# Patient Record
Sex: FEMALE | Race: WHITE | Employment: FULL TIME | URBAN - NONMETROPOLITAN AREA
[De-identification: names, ages, dates, MRNs, and addresses within clinical notes are randomized per-mention and may not be internally consistent; named-entity substitution may affect disease eponyms.]

---

## 2017-09-25 ENCOUNTER — TELEPHONE (OUTPATIENT)
Dept: INTERNAL MEDICINE CLINIC | Age: 62
End: 2017-09-25

## 2017-09-26 ENCOUNTER — TELEPHONE (OUTPATIENT)
Dept: INTERNAL MEDICINE CLINIC | Age: 62
End: 2017-09-26

## 2017-09-28 NOTE — TELEPHONE ENCOUNTER
Last seen 11/3/16, this is a 1 year f/u. Last had BMP, HgA1c, Lipid, HFP, Microalbumin-5577 in 10/8/16.

## 2017-10-02 DIAGNOSIS — E11.9 CONTROLLED TYPE 2 DIABETES MELLITUS WITHOUT COMPLICATION, WITHOUT LONG-TERM CURRENT USE OF INSULIN (HCC): ICD-10-CM

## 2017-10-02 DIAGNOSIS — I10 ESSENTIAL HYPERTENSION: Primary | ICD-10-CM

## 2017-10-02 DIAGNOSIS — E78.1 PURE HYPERGLYCERIDEMIA: ICD-10-CM

## 2017-10-07 LAB
CHOLESTEROL, TOTAL: 194 MG/DL
CHOLESTEROL/HDL RATIO: 2.9
HDLC SERPL-MCNC: 65 MG/DL (ref 35–70)
LDL CHOLESTEROL CALCULATED: 108 MG/DL (ref 0–160)
TRIGL SERPL-MCNC: 104 MG/DL
VLDLC SERPL CALC-MCNC: 20 MG/DL

## 2017-10-31 ENCOUNTER — HOSPITAL ENCOUNTER (OUTPATIENT)
Age: 62
Discharge: HOME | End: 2017-10-31
Payer: COMMERCIAL

## 2017-10-31 ENCOUNTER — OFFICE VISIT (OUTPATIENT)
Dept: INTERNAL MEDICINE CLINIC | Age: 62
End: 2017-10-31
Payer: COMMERCIAL

## 2017-10-31 VITALS
HEART RATE: 72 BPM | BODY MASS INDEX: 22.16 KG/M2 | HEIGHT: 65 IN | WEIGHT: 133 LBS | DIASTOLIC BLOOD PRESSURE: 84 MMHG | SYSTOLIC BLOOD PRESSURE: 136 MMHG

## 2017-10-31 DIAGNOSIS — E78.5 HYPERLIPIDEMIA, UNSPECIFIED HYPERLIPIDEMIA TYPE: ICD-10-CM

## 2017-10-31 DIAGNOSIS — Z91.199 NONCOMPLIANCE: ICD-10-CM

## 2017-10-31 DIAGNOSIS — I10 ESSENTIAL HYPERTENSION: ICD-10-CM

## 2017-10-31 DIAGNOSIS — E11.9 CONTROLLED TYPE 2 DIABETES MELLITUS WITHOUT COMPLICATION, WITHOUT LONG-TERM CURRENT USE OF INSULIN (HCC): Primary | ICD-10-CM

## 2017-10-31 LAB
CREATININE, URINE: 75.6 MG/DL
MICROALBUMIN UR-MCNC: 5.06 MG/DL
MICROALBUMIN/CREAT UR-RTO: 67 MG/G (ref 0–30)

## 2017-10-31 PROCEDURE — 93000 ELECTROCARDIOGRAM COMPLETE: CPT | Performed by: INTERNAL MEDICINE

## 2017-10-31 PROCEDURE — 82043 UR ALBUMIN QUANTITATIVE: CPT

## 2017-10-31 PROCEDURE — 99214 OFFICE O/P EST MOD 30 MIN: CPT | Performed by: INTERNAL MEDICINE

## 2017-10-31 RX ORDER — ATORVASTATIN CALCIUM 10 MG/1
TABLET, FILM COATED ORAL
Qty: 30 TABLET | Refills: 5 | Status: CANCELLED | OUTPATIENT
Start: 2017-10-31

## 2017-10-31 RX ORDER — GLIMEPIRIDE 1 MG/1
TABLET ORAL
Qty: 30 TABLET | Refills: 6 | Status: CANCELLED | OUTPATIENT
Start: 2017-10-31 | End: 2018-10-31

## 2017-10-31 RX ORDER — LISINOPRIL 5 MG/1
TABLET ORAL
Qty: 30 TABLET | Refills: 5 | Status: CANCELLED | OUTPATIENT
Start: 2017-10-31

## 2017-10-31 ASSESSMENT — PATIENT HEALTH QUESTIONNAIRE - PHQ9
SUM OF ALL RESPONSES TO PHQ QUESTIONS 1-9: 0
2. FEELING DOWN, DEPRESSED OR HOPELESS: 0
SUM OF ALL RESPONSES TO PHQ9 QUESTIONS 1 & 2: 0
1. LITTLE INTEREST OR PLEASURE IN DOING THINGS: 0

## 2017-10-31 NOTE — PROGRESS NOTES
[facility] PROFESSIONAL SERVS  PHYSICIANS [place] [address] [place] [address] [place], [place] Christianson  Dept: [phone]  Dept Fax: [phone]      Chief Complaint   Patient presents with    Diabetes    Hypertension       Patient presents for evaluation of diabetes. I have never seen the patient before. This patient is followed regularly by  Antwon Hollis NP. Patient used to be seen by Dr. Savanna Milligan who no longer works here. The patient is diabetic as well as hypertensive  She last saw Dr. Savanna Milligan a year ago. She says she feels great. She is here with her . She denies chest pain, shortness of breath, dizziness or syncope    Patient Active Problem List   Diagnosis    HTN (hypertension)    DM II [facility]di[facility] mellitus, type II), [facility] ([facility]    Hyperlipemia       Current Outpatient Prescriptions   Medication Sig Dispense Refill    glimepiride (AMARYL) 1 MG tablet Change to one QOD 30 tablet 6    metFORMIN (GLUCOPHAGE) 1000 MG tablet TAKE 1 TABLET BY MOUTH TWO TIMES A DAY WITH MEALS 60 tablet 5    lisinopril (PRINIVIL;ZESTRIL) 5 MG tablet TAKE 1 TABLET BY MOUTH ONE TIME A DAY  30 tablet 5    atorvastatin (LIPITOR) 10 MG tablet TAKE 1 TABLET BY MOUTH ONE TIME A DAY  30 tablet 5    aspirin 81 MG tablet Take 81 mg by mouth daily. No current facility-administered medications for this visit. History reviewed. No pertinent surgical history. Allergies   Allergen Reactions    Fish-Derived Products Swelling       Social History     Social History    Marital status:      Spouse name: N/A    Number of children: N/A    Years of education: N/A     Occupational History    Not on file.      Social History Main Topics    Smoking status: Never Smoker    Smokeless tobacco: Never Used    Alcohol use No    Drug use: No    Sexual activity: Not on file     Other Topics Concern    Not on file     Social History Narrative    No narrative on file   3 children  Retired  for [employer]    Family History   Problem Relation Age of Onset    Diabetes Mother     Heart Disease Father        Review of Systems - General ROS: negative  Psychological ROS: negative  Hematological and Lymphatic ROS: negative  Respiratory ROS: no cough, shortness of breath, or wheezing  Cardiovascular ROS: no chest pain or dyspnea on exertion  Gastrointestinal ROS: no abdominal pain, change in bowel habits, or black or bloody stools  Genito-Urinary ROS: no dysuria, trouble voiding, or hematuria  Musculoskeletal ROS: negative  Neurological ROS: no TIA or stroke symptoms  Dermatological ROS: negative    Blood pressure 136/84, pulse 72, height 5' 5\" (1.651 m), weight 133 lb (60.3 kg). Physical Examination: General appearance - alert, well appearing, and in no distress  Mental status - alert, oriented to person, place, and time  Neck - supple, no significant adenopathy  Chest - clear to auscultation, no wheezes, rales or rhonchi, symmetric air entry  Heart - normal rate, regular rhythm, normal S1, S2, no murmurs, rubs, clicks or gallops  Abdomen - soft, nontender, nondistended, no masses or organomegaly  Neurological - alert, oriented, normal speech, no focal findings or movement disorder noted  Musculoskeletal - no joint tenderness, deformity or swelling  Extremities - peripheral pulses normal, no pedal edema, no clubbing or cyanosis  Skin - normal coloration and turgor, no rashes, no suspicious skin lesions noted         1. Controlled type 2 diabetes mellitus without complication, without long-term current use of insulin (HCC)  Microalbumin / Creatinine Urine Ratio    CANCELED: POCT glycosylated hemoglobin (Hb A1C)    CANCELED: 01688 - Collection Capillary Blood Specimen    CANCELED: Microalbumin / Creatinine Urine Ratio   2. Essential hypertension  EKG 12 Lead   3. Noncompliance     4.  Hyperlipidemia, unspecified hyperlipidemia type         Orders Placed This Encounter   Procedures    Microalbumin / Creatinine Urine Ratio     Standing Status:   Future     Standing Expiration Date:   10/31/2018    EKG 12 Lead     Order Specific Question:   Reason for Exam?     Answer: Other        EKG shows normal sinus rhythm, normal EKG  Extensive counseling was done regarding diabetes.  The goals are to decrease or prevent the complications of diabetes and improve survival. The following goals were discussed  HgbA1c < 7 (providing no hypoglycemia)    BMI  18-25    BP < 130/80    STATIN(medium or high risk)    DIET <20% protein  < 30% fat, no trans fats, calorie restricted if BMI high    URINE MICROALBUMIN/CREATINIE  < 30     DILATED EYE EXAM EVERY 1-2 YEARS    MONITOR FEET FOR SORES, NEUROPATHY, ETC    REGULAR DENTAL CARE    At the end of the visit the patient said she has not taking any medications since March   She says she's leaving it in the hands of the Lord  She said she will not take any medications in the future  I tred to get her to change her mind, but she would not  I will see her in a year

## 2018-10-01 ENCOUNTER — TELEPHONE (OUTPATIENT)
Dept: INTERNAL MEDICINE CLINIC | Age: 63
End: 2018-10-01

## 2018-10-01 NOTE — TELEPHONE ENCOUNTER
Patient has appointment 10/30/18 and wants to know what labs are needing to be done. Previously had lipid, hepatic, tsh, free t4, cbc. Call pt and they will  orders.

## 2018-10-02 DIAGNOSIS — E78.5 HYPERLIPIDEMIA, UNSPECIFIED HYPERLIPIDEMIA TYPE: ICD-10-CM

## 2018-10-02 DIAGNOSIS — I10 ESSENTIAL HYPERTENSION: Primary | ICD-10-CM

## 2018-10-18 LAB
ABSOLUTE BASO #: 100 /CMM (ref 0–200)
ABSOLUTE EOS #: 200 /CMM (ref 0–500)
ABSOLUTE LYMPH #: 2200 /CMM (ref 1000–4800)
ABSOLUTE MONO #: 500 /CMM (ref 0–800)
ABSOLUTE NEUT #: 4500 /CMM (ref 1800–7700)
ALBUMIN SERPL-MCNC: 4.6 GM/DL (ref 3.5–5)
ALP BLD-CCNC: 82 IU/L (ref 39–118)
ALT SERPL-CCNC: 15 IU/L (ref 10–40)
AST SERPL-CCNC: 15 IU/L (ref 15–41)
BASOPHILS RELATIVE PERCENT: 1.1 % (ref 0–2)
BILIRUB SERPL-MCNC: 0.4 MG/DL (ref 0.2–1)
BILIRUBIN DIRECT: 0.1 MG/DL (ref 0.1–0.2)
CHOLESTEROL/HDL RELATIVE RISK: 3.3 (ref 4–4.4)
CHOLESTEROL: 162 MG/DL
DIRECT-LDL / HDL RISK: 2.4
EOSINOPHILS RELATIVE PERCENT: 2.6 % (ref 0–6)
HCT VFR BLD CALC: 39.2 % (ref 35–44)
HDLC SERPL-MCNC: 48 MG/DL
HEMOGLOBIN: 12.8 GM/DL (ref 12–15)
LDL CHOLESTEROL DIRECT: 119 MG/DL
LYMPHOCYTES RELATIVE PERCENT: 29.8 % (ref 15–45)
MCH RBC QN AUTO: 28.4 PG (ref 27.5–33)
MCHC RBC AUTO-ENTMCNC: 32.6 GM/DL (ref 33–36)
MCV RBC AUTO: 87.3 CU MIC (ref 80–97)
MONOCYTES RELATIVE PERCENT: 6.3 % (ref 2–10)
NEUTROPHILS RELATIVE PERCENT: 60.2 % (ref 40–70)
NUCLEATED RBCS: 0 /100 WBC
PDW BLD-RTO: 13.6 % (ref 12–16)
PLATELET # BLD: 302 TH/CMM (ref 150–400)
RBC # BLD: 4.5 MIL/CMM (ref 4–5.1)
T4 FREE: 0.97 NG/DL (ref 0.61–1.12)
TOTAL PROTEIN: 7.7 G/DL (ref 6.2–8)
TRIGL SERPL-MCNC: 77 MG/DL
TSH SERPL DL<=0.05 MIU/L-ACNC: 2.81 MCIU/ML (ref 0.49–4.67)
VLDLC SERPL CALC-MCNC: 15 MG/DL
WBC # BLD: 7.5 TH/CMM (ref 4.4–10.5)

## 2018-10-22 ENCOUNTER — TELEPHONE (OUTPATIENT)
Dept: INTERNAL MEDICINE | Age: 63
End: 2018-10-22

## 2018-10-25 ENCOUNTER — TELEPHONE (OUTPATIENT)
Dept: INTERNAL MEDICINE CLINIC | Age: 63
End: 2018-10-25

## 2018-10-30 ENCOUNTER — OFFICE VISIT (OUTPATIENT)
Dept: INTERNAL MEDICINE CLINIC | Age: 63
End: 2018-10-30
Payer: MEDICARE

## 2018-10-30 VITALS
HEART RATE: 96 BPM | WEIGHT: 135 LBS | BODY MASS INDEX: 22.49 KG/M2 | OXYGEN SATURATION: 98 % | DIASTOLIC BLOOD PRESSURE: 94 MMHG | SYSTOLIC BLOOD PRESSURE: 180 MMHG | HEIGHT: 65 IN

## 2018-10-30 DIAGNOSIS — R01.1 HEART MURMUR: ICD-10-CM

## 2018-10-30 DIAGNOSIS — E78.5 HYPERLIPIDEMIA, UNSPECIFIED HYPERLIPIDEMIA TYPE: ICD-10-CM

## 2018-10-30 DIAGNOSIS — E11.65 UNCONTROLLED TYPE 2 DIABETES MELLITUS WITH HYPERGLYCEMIA (HCC): Primary | ICD-10-CM

## 2018-10-30 DIAGNOSIS — I10 ESSENTIAL HYPERTENSION: ICD-10-CM

## 2018-10-30 LAB — HBA1C MFR BLD: 8.1 % (ref 4.3–5.7)

## 2018-10-30 PROCEDURE — 3017F COLORECTAL CA SCREEN DOC REV: CPT | Performed by: INTERNAL MEDICINE

## 2018-10-30 PROCEDURE — 1123F ACP DISCUSS/DSCN MKR DOCD: CPT | Performed by: INTERNAL MEDICINE

## 2018-10-30 PROCEDURE — 1036F TOBACCO NON-USER: CPT | Performed by: INTERNAL MEDICINE

## 2018-10-30 PROCEDURE — 4040F PNEUMOC VAC/ADMIN/RCVD: CPT | Performed by: INTERNAL MEDICINE

## 2018-10-30 PROCEDURE — G8400 PT W/DXA NO RESULTS DOC: HCPCS | Performed by: INTERNAL MEDICINE

## 2018-10-30 PROCEDURE — 1101F PT FALLS ASSESS-DOCD LE1/YR: CPT | Performed by: INTERNAL MEDICINE

## 2018-10-30 PROCEDURE — 99204 OFFICE O/P NEW MOD 45 MIN: CPT | Performed by: INTERNAL MEDICINE

## 2018-10-30 PROCEDURE — 2022F DILAT RTA XM EVC RTNOPTHY: CPT | Performed by: INTERNAL MEDICINE

## 2018-10-30 PROCEDURE — G8420 CALC BMI NORM PARAMETERS: HCPCS | Performed by: INTERNAL MEDICINE

## 2018-10-30 PROCEDURE — G8427 DOCREV CUR MEDS BY ELIG CLIN: HCPCS | Performed by: INTERNAL MEDICINE

## 2018-10-30 PROCEDURE — 1090F PRES/ABSN URINE INCON ASSESS: CPT | Performed by: INTERNAL MEDICINE

## 2018-10-30 PROCEDURE — G8484 FLU IMMUNIZE NO ADMIN: HCPCS | Performed by: INTERNAL MEDICINE

## 2018-10-30 PROCEDURE — 3045F PR MOST RECENT HEMOGLOBIN A1C LEVEL 7.0-9.0%: CPT | Performed by: INTERNAL MEDICINE

## 2018-10-30 PROCEDURE — 83036 HEMOGLOBIN GLYCOSYLATED A1C: CPT | Performed by: INTERNAL MEDICINE

## 2018-10-30 ASSESSMENT — PATIENT HEALTH QUESTIONNAIRE - PHQ9
SUM OF ALL RESPONSES TO PHQ9 QUESTIONS 1 & 2: 0
SUM OF ALL RESPONSES TO PHQ QUESTIONS 1-9: 0
SUM OF ALL RESPONSES TO PHQ QUESTIONS 1-9: 0
2. FEELING DOWN, DEPRESSED OR HOPELESS: 0
1. LITTLE INTEREST OR PLEASURE IN DOING THINGS: 0

## 2018-10-31 RX ORDER — GLIMEPIRIDE 1 MG/1
1 TABLET ORAL
Qty: 90 TABLET | Refills: 3 | Status: SHIPPED | OUTPATIENT
Start: 2018-10-31 | End: 2019-05-20 | Stop reason: SINTOL

## 2018-10-31 RX ORDER — ATORVASTATIN CALCIUM 10 MG/1
TABLET, FILM COATED ORAL
Qty: 90 TABLET | Refills: 3 | Status: SHIPPED | OUTPATIENT
Start: 2018-10-31 | End: 2019-10-28 | Stop reason: SDUPTHER

## 2018-10-31 RX ORDER — LISINOPRIL 5 MG/1
TABLET ORAL
Qty: 90 TABLET | Refills: 3 | Status: SHIPPED | OUTPATIENT
Start: 2018-10-31 | End: 2019-10-21 | Stop reason: SDUPTHER

## 2018-11-20 ENCOUNTER — OFFICE VISIT (OUTPATIENT)
Dept: INTERNAL MEDICINE CLINIC | Age: 63
End: 2018-11-20
Payer: MEDICARE

## 2018-11-20 VITALS
HEART RATE: 84 BPM | SYSTOLIC BLOOD PRESSURE: 134 MMHG | HEIGHT: 65 IN | WEIGHT: 133.6 LBS | DIASTOLIC BLOOD PRESSURE: 70 MMHG | BODY MASS INDEX: 22.26 KG/M2

## 2018-11-20 DIAGNOSIS — R01.0 INNOCENT HEART MURMUR: Primary | ICD-10-CM

## 2018-11-20 DIAGNOSIS — I10 ESSENTIAL HYPERTENSION: ICD-10-CM

## 2018-11-20 DIAGNOSIS — E11.9 CONTROLLED TYPE 2 DIABETES MELLITUS WITHOUT COMPLICATION, WITHOUT LONG-TERM CURRENT USE OF INSULIN (HCC): ICD-10-CM

## 2018-11-20 DIAGNOSIS — E78.5 HYPERLIPIDEMIA, UNSPECIFIED HYPERLIPIDEMIA TYPE: ICD-10-CM

## 2018-11-20 PROCEDURE — G8400 PT W/DXA NO RESULTS DOC: HCPCS | Performed by: INTERNAL MEDICINE

## 2018-11-20 PROCEDURE — 1101F PT FALLS ASSESS-DOCD LE1/YR: CPT | Performed by: INTERNAL MEDICINE

## 2018-11-20 PROCEDURE — 1036F TOBACCO NON-USER: CPT | Performed by: INTERNAL MEDICINE

## 2018-11-20 PROCEDURE — 2022F DILAT RTA XM EVC RTNOPTHY: CPT | Performed by: INTERNAL MEDICINE

## 2018-11-20 PROCEDURE — G8420 CALC BMI NORM PARAMETERS: HCPCS | Performed by: INTERNAL MEDICINE

## 2018-11-20 PROCEDURE — 99212 OFFICE O/P EST SF 10 MIN: CPT | Performed by: INTERNAL MEDICINE

## 2018-11-20 PROCEDURE — 3045F PR MOST RECENT HEMOGLOBIN A1C LEVEL 7.0-9.0%: CPT | Performed by: INTERNAL MEDICINE

## 2018-11-20 PROCEDURE — 3017F COLORECTAL CA SCREEN DOC REV: CPT | Performed by: INTERNAL MEDICINE

## 2018-11-20 PROCEDURE — 1123F ACP DISCUSS/DSCN MKR DOCD: CPT | Performed by: INTERNAL MEDICINE

## 2018-11-20 PROCEDURE — 1090F PRES/ABSN URINE INCON ASSESS: CPT | Performed by: INTERNAL MEDICINE

## 2018-11-20 PROCEDURE — 4040F PNEUMOC VAC/ADMIN/RCVD: CPT | Performed by: INTERNAL MEDICINE

## 2018-11-20 PROCEDURE — G8427 DOCREV CUR MEDS BY ELIG CLIN: HCPCS | Performed by: INTERNAL MEDICINE

## 2018-11-20 PROCEDURE — G8484 FLU IMMUNIZE NO ADMIN: HCPCS | Performed by: INTERNAL MEDICINE

## 2019-05-20 ENCOUNTER — OFFICE VISIT (OUTPATIENT)
Dept: INTERNAL MEDICINE CLINIC | Age: 64
End: 2019-05-20
Payer: MEDICARE

## 2019-05-20 VITALS
HEART RATE: 89 BPM | BODY MASS INDEX: 18.74 KG/M2 | DIASTOLIC BLOOD PRESSURE: 66 MMHG | WEIGHT: 112.5 LBS | SYSTOLIC BLOOD PRESSURE: 130 MMHG | RESPIRATION RATE: 12 BRPM | HEIGHT: 65 IN

## 2019-05-20 DIAGNOSIS — R01.0 INNOCENT HEART MURMUR: ICD-10-CM

## 2019-05-20 DIAGNOSIS — E78.5 HYPERLIPIDEMIA, UNSPECIFIED HYPERLIPIDEMIA TYPE: ICD-10-CM

## 2019-05-20 DIAGNOSIS — I10 ESSENTIAL HYPERTENSION: ICD-10-CM

## 2019-05-20 DIAGNOSIS — E11.9 CONTROLLED TYPE 2 DIABETES MELLITUS WITHOUT COMPLICATION, WITHOUT LONG-TERM CURRENT USE OF INSULIN (HCC): Primary | ICD-10-CM

## 2019-05-20 LAB — HBA1C MFR BLD: 5.9 % (ref 4.3–5.7)

## 2019-05-20 PROCEDURE — 99214 OFFICE O/P EST MOD 30 MIN: CPT | Performed by: NURSE PRACTITIONER

## 2019-05-20 RX ORDER — METFORMIN HYDROCHLORIDE 500 MG/1
2000 TABLET, EXTENDED RELEASE ORAL
Qty: 360 TABLET | Refills: 1 | Status: SHIPPED | OUTPATIENT
Start: 2019-05-20 | End: 2019-11-05 | Stop reason: SDUPTHER

## 2019-05-20 RX ORDER — M-VIT,TX,IRON,MINS/CALC/FOLIC 27MG-0.4MG
1 TABLET ORAL DAILY
COMMUNITY

## 2019-05-20 ASSESSMENT — PATIENT HEALTH QUESTIONNAIRE - PHQ9
2. FEELING DOWN, DEPRESSED OR HOPELESS: 0
SUM OF ALL RESPONSES TO PHQ9 QUESTIONS 1 & 2: 0
SUM OF ALL RESPONSES TO PHQ QUESTIONS 1-9: 0
SUM OF ALL RESPONSES TO PHQ QUESTIONS 1-9: 0
1. LITTLE INTEREST OR PLEASURE IN DOING THINGS: 0

## 2019-05-20 NOTE — PATIENT INSTRUCTIONS
Extensive counseling was done regarding diabetes. The goals are to decrease or prevent the complications of diabetes and improve survival. The following goals were discussed  HgbA1c < 7 (providing no hypoglycemia)  - Today A1C is 5.9%  - Stop Glimeperide and call sugars in two weeks, sooner if any low glucose. Will switch to long acting Metformin due to diarrhea. BMI  18-25  - At goal, continue current dietary modifications. BP < 130/80    STATIN(medium or high risk)    URINE MICROALBUMIN/CREATININE  < 30    DILATED EYE EXAM EVERY 1-2 YEARS    MONITOR FEET FOR SORES, NEUROPATHY, ETC  - Diabetic foot care is important in order to avoid foot wounds:    Never go barefoot even at home. Test your bathwater temperature before you step in. Nails should be trimmed to the shape of the toe. Wash and check your feet for new skin changes every day. Socks should fit snugly, not be tight and be changed every day. Shoes should fit snugly, neither tight nor loose. If deformities or wounds you may need special fitted shoes. REGULAR DENTAL CARE    Echo recheck at one year    See me in 6 months, sooner if problems. Fasting labs prior ot next appointment, can get these at the local health fair.

## 2019-05-20 NOTE — PROGRESS NOTES
Chest - No distress. No increased work of breathing. Clear to auscultation in all fields, no wheezes, rales or rhonchi, symmetric air entry. Heart - normal rate, regular rhythm, normal S1, S2, no rubs  or gallops, 6-8/2 mid systolic murmur heard only at RUSB without radiation to carotids. Abdomen -soft, nontender, nondistended  Neurological - alert, oriented, grossly intact  Extremities - peripheral pulses normal, no pedal edema, no clubbing or cyanosis  Skin - warm and dry, no rashes, lesions, or wounds evident on exposed skin    DIABETIC FOOT EXAM  Visual inspection:  Deformity/amputation: absent  Skin lesions/pre-ulcerative calluses: absent  Edema: right- negative, left- negative    Sensory exam:  Monofilament sensation: normal  (minimum of 5 random plantar locations tested, avoiding callused areas - > 1 area with absence of sensation is + for neuropathy)    Plus at least one of the following:  Pulses: normal,   Pinprick: N/A  Proprioception: Intact  Vibration (128 Hz): N/A    Diagnostic Data:  No recent labs for review.      Lab Results   Component Value Date     10/08/2016    K 4.3 10/08/2016     10/08/2016    CO2 26 10/08/2016    BUN 14 10/08/2016    CREATININE 0.54 10/08/2016    GLUCOSE 116 10/08/2016    CALCIUM 9.5 10/08/2016      Lab Results   Component Value Date    LABA1C 5.9 (H) 05/20/2019     Lab Results   Component Value Date     10/08/2016     Lab Results   Component Value Date    CHOL 162 10/18/2018    CHOL 194 10/07/2017    CHOL 128 10/08/2016     Lab Results   Component Value Date    TRIG 77 10/18/2018    TRIG 104 10/07/2017    TRIG 107 10/08/2016     Lab Results   Component Value Date    HDL 48 10/18/2018    HDL 65 10/07/2017    HDL 58 10/08/2016     Lab Results   Component Value Date    LDLCALC 108 10/07/2017    LDLCALC 49 10/08/2016    LDLCALC 96 05/06/2014     Lab Results   Component Value Date    VLDL 15 10/18/2018    VLDL 20 10/07/2017    VLDL 21 10/08/2016     Lab Results   Component Value Date    CHOLHDLRATIO 2.9 10/07/2017     Lab Results   Component Value Date    WBC 7.5 10/18/2018    HGB 12.8 10/18/2018    HCT 39.2 10/18/2018    MCV 87.3 10/18/2018     10/18/2018         Assessment/Plan:   Diagnosis Orders   1. Controlled type 2 diabetes mellitus without complication, without long-term current use of insulin (HCC)  metFORMIN (GLUCOPHAGE-XR) 500 MG extended release tablet    Comprehensive Metabolic Panel, Fasting    HM DIABETES FOOT EXAM   2. Innocent heart murmur  ECHO Complete 2D W Doppler W Color   3. Hyperlipidemia, unspecified hyperlipidemia type  Comprehensive Metabolic Panel, Fasting    Lipid, Fasting    Microalbumin / Creatinine Urine Ratio   4. Essential hypertension  Comprehensive Metabolic Panel, Fasting       Orders Placed This Encounter   Procedures    Comprehensive Metabolic Panel, Fasting     Standing Status:   Future     Standing Expiration Date:   5/20/2020    Lipid, Fasting     Standing Status:   Future     Standing Expiration Date:   5/20/2020    Microalbumin / Creatinine Urine Ratio     Standing Status:   Future     Standing Expiration Date:   5/20/2020    POCT glycosylated hemoglobin (Hb A1C)    ECHO Complete 2D W Doppler W Color     Standing Status:   Future     Standing Expiration Date:   5/20/2020     Order Specific Question:   Reason for exam:     Answer:   aortic sclerosis, heart murmur recheck    HM DIABETES FOOT EXAM     1) DM 2, well controlled    Extensive counseling was done regarding diabetes. The goals are to decrease or prevent the complications of diabetes and improve survival. The following goals were discussed  HgbA1c < 7 (providing no hypoglycemia)  - Today A1C is 5.9%, recheck at next appointment. - Stop Glimeperide and call sugars in two weeks, sooner if any low glucose. Will switch to long acting Metformin due to diarrhea. BMI  18-25  - At goal, continue current dietary modifications.      BP <

## 2019-09-23 ENCOUNTER — TELEPHONE (OUTPATIENT)
Dept: INTERNAL MEDICINE CLINIC | Age: 64
End: 2019-09-23

## 2019-10-21 DIAGNOSIS — E11.65 UNCONTROLLED TYPE 2 DIABETES MELLITUS WITH HYPERGLYCEMIA (HCC): ICD-10-CM

## 2019-10-21 LAB
A/G RATIO: 1.5 (ref 1.5–2.5)
ALBUMIN SERPL-MCNC: 4.3 GM/DL (ref 3.5–5)
ALP BLD-CCNC: 58 IU/L (ref 39–118)
ALT SERPL-CCNC: 15 IU/L (ref 10–40)
ANION GAP SERPL CALCULATED.3IONS-SCNC: 6 MMOL/L (ref 4–12)
AST SERPL-CCNC: 14 IU/L (ref 15–41)
BILIRUB SERPL-MCNC: 0.6 MG/DL (ref 0.2–1)
BUN BLDV-MCNC: 12 MG/DL (ref 7–20)
CALCIUM SERPL-MCNC: 9.6 MG/DL (ref 8.8–10.5)
CHLORIDE BLD-SCNC: 107 MEQ/L (ref 101–111)
CHOLESTEROL/HDL RELATIVE RISK: 1.8 (ref 4–4.4)
CHOLESTEROL: 112 MG/DL
CO2: 29 MEQ/L (ref 21–32)
CREAT SERPL-MCNC: 0.62 MG/DL (ref 0.6–1.3)
CREATININE CLEARANCE: >60
CREATININE, RANDOM URINE: 43.6 MG/DL
DIRECT-LDL / HDL RISK: 0.6
GLUCOSE: 103 MG/DL (ref 70–110)
HDLC SERPL-MCNC: 61 MG/DL
LDL CHOLESTEROL DIRECT: 42 MG/DL
MICROALBUMIN UR-MCNC: 15.6 MG/L
MICROALBUMIN/CREAT UR-RTO: 35.8 MG/GM (ref 0–30)
POTASSIUM SERPL-SCNC: 4.2 MEQ/L (ref 3.6–5)
SODIUM BLD-SCNC: 142 MEQ/L (ref 135–145)
TOTAL PROTEIN: 7.1 G/DL (ref 6.2–8)
TRIGL SERPL-MCNC: 81 MG/DL
VLDLC SERPL CALC-MCNC: 16 MG/DL

## 2019-10-21 RX ORDER — LISINOPRIL 5 MG/1
TABLET ORAL
Qty: 90 TABLET | Refills: 3 | Status: SHIPPED
Start: 2019-10-21 | End: 2020-05-05 | Stop reason: DRUGHIGH

## 2019-10-28 DIAGNOSIS — E11.65 UNCONTROLLED TYPE 2 DIABETES MELLITUS WITH HYPERGLYCEMIA (HCC): ICD-10-CM

## 2019-10-28 RX ORDER — ATORVASTATIN CALCIUM 10 MG/1
TABLET, FILM COATED ORAL
Qty: 90 TABLET | Refills: 3 | Status: SHIPPED | OUTPATIENT
Start: 2019-10-28 | End: 2019-11-05 | Stop reason: SDUPTHER

## 2019-11-05 ENCOUNTER — OFFICE VISIT (OUTPATIENT)
Dept: INTERNAL MEDICINE CLINIC | Age: 64
End: 2019-11-05
Payer: MEDICARE

## 2019-11-05 VITALS
SYSTOLIC BLOOD PRESSURE: 136 MMHG | HEART RATE: 72 BPM | BODY MASS INDEX: 17.49 KG/M2 | HEIGHT: 65 IN | DIASTOLIC BLOOD PRESSURE: 70 MMHG | WEIGHT: 105 LBS

## 2019-11-05 DIAGNOSIS — E78.5 HYPERLIPIDEMIA, UNSPECIFIED HYPERLIPIDEMIA TYPE: ICD-10-CM

## 2019-11-05 DIAGNOSIS — E11.9 CONTROLLED TYPE 2 DIABETES MELLITUS WITHOUT COMPLICATION, WITHOUT LONG-TERM CURRENT USE OF INSULIN (HCC): Primary | ICD-10-CM

## 2019-11-05 DIAGNOSIS — I10 ESSENTIAL HYPERTENSION: ICD-10-CM

## 2019-11-05 LAB — HBA1C MFR BLD: 6.4 % (ref 4.3–5.7)

## 2019-11-05 PROCEDURE — 1123F ACP DISCUSS/DSCN MKR DOCD: CPT | Performed by: NURSE PRACTITIONER

## 2019-11-05 PROCEDURE — 3017F COLORECTAL CA SCREEN DOC REV: CPT | Performed by: NURSE PRACTITIONER

## 2019-11-05 PROCEDURE — 2022F DILAT RTA XM EVC RTNOPTHY: CPT | Performed by: NURSE PRACTITIONER

## 2019-11-05 PROCEDURE — 4040F PNEUMOC VAC/ADMIN/RCVD: CPT | Performed by: NURSE PRACTITIONER

## 2019-11-05 PROCEDURE — 1036F TOBACCO NON-USER: CPT | Performed by: NURSE PRACTITIONER

## 2019-11-05 PROCEDURE — 3044F HG A1C LEVEL LT 7.0%: CPT | Performed by: NURSE PRACTITIONER

## 2019-11-05 PROCEDURE — G8400 PT W/DXA NO RESULTS DOC: HCPCS | Performed by: NURSE PRACTITIONER

## 2019-11-05 PROCEDURE — 99213 OFFICE O/P EST LOW 20 MIN: CPT | Performed by: NURSE PRACTITIONER

## 2019-11-05 PROCEDURE — 1090F PRES/ABSN URINE INCON ASSESS: CPT | Performed by: NURSE PRACTITIONER

## 2019-11-05 PROCEDURE — G8484 FLU IMMUNIZE NO ADMIN: HCPCS | Performed by: NURSE PRACTITIONER

## 2019-11-05 PROCEDURE — G8419 CALC BMI OUT NRM PARAM NOF/U: HCPCS | Performed by: NURSE PRACTITIONER

## 2019-11-05 PROCEDURE — 83036 HEMOGLOBIN GLYCOSYLATED A1C: CPT | Performed by: NURSE PRACTITIONER

## 2019-11-05 PROCEDURE — G8427 DOCREV CUR MEDS BY ELIG CLIN: HCPCS | Performed by: NURSE PRACTITIONER

## 2019-11-05 RX ORDER — METFORMIN HYDROCHLORIDE 500 MG/1
2000 TABLET, EXTENDED RELEASE ORAL
Qty: 360 TABLET | Refills: 1 | Status: SHIPPED | OUTPATIENT
Start: 2019-11-05 | End: 2020-05-14 | Stop reason: SDUPTHER

## 2019-11-05 RX ORDER — ATORVASTATIN CALCIUM 10 MG/1
TABLET, FILM COATED ORAL
Qty: 90 TABLET | Refills: 3 | Status: SHIPPED | OUTPATIENT
Start: 2019-11-05 | End: 2020-09-16 | Stop reason: SDUPTHER

## 2019-11-05 ASSESSMENT — ENCOUNTER SYMPTOMS
CONSTIPATION: 0
VOMITING: 0
SORE THROAT: 0
TROUBLE SWALLOWING: 0
ABDOMINAL PAIN: 0
COUGH: 0
DIARRHEA: 0
SHORTNESS OF BREATH: 0
CHOKING: 0
NAUSEA: 0
EYE ITCHING: 0

## 2020-03-05 LAB
ANION GAP SERPL CALCULATED.3IONS-SCNC: 10 MMOL/L (ref 4–12)
BUN BLDV-MCNC: 13 MG/DL (ref 7–20)
CALCIUM SERPL-MCNC: 9.4 MG/DL (ref 8.8–10.5)
CHLORIDE BLD-SCNC: 106 MEQ/L (ref 101–111)
CO2: 26 MEQ/L (ref 21–32)
CREAT SERPL-MCNC: 0.79 MG/DL (ref 0.6–1.3)
CREATININE CLEARANCE: >60
CREATININE, RANDOM URINE: 34.9 MG/DL
GLUCOSE, FASTING: 137 MG/DL (ref 70–110)
MICROALBUMIN UR-MCNC: 12.6 MG/L
MICROALBUMIN/CREAT UR-RTO: 36.1 MG/GM (ref 0–30)
POTASSIUM SERPL-SCNC: 4.3 MEQ/L (ref 3.6–5)
SODIUM BLD-SCNC: 142 MEQ/L (ref 135–145)

## 2020-03-10 ENCOUNTER — TELEPHONE (OUTPATIENT)
Dept: INTERNAL MEDICINE CLINIC | Age: 65
End: 2020-03-10

## 2020-03-16 RX ORDER — LISINOPRIL 10 MG/1
10 TABLET ORAL DAILY
Qty: 90 TABLET | Refills: 1 | Status: SHIPPED | OUTPATIENT
Start: 2020-03-16 | End: 2020-09-16 | Stop reason: SDUPTHER

## 2020-04-27 ENCOUNTER — TELEPHONE (OUTPATIENT)
Dept: INTERNAL MEDICINE CLINIC | Age: 65
End: 2020-04-27

## 2020-04-27 NOTE — TELEPHONE ENCOUNTER
4/27/2020 Unable to [address] to convert 5/5/2020 appointment @ 9:20 to VV.     If patient isn't comfortable with this, change to VV Special Case and alhaji in notes that it is a telephone visit or RS into June.

## 2020-05-05 ENCOUNTER — OFFICE VISIT (OUTPATIENT)
Dept: INTERNAL MEDICINE CLINIC | Age: 65
End: 2020-05-05
Payer: MEDICARE

## 2020-05-05 VITALS
HEART RATE: 74 BPM | SYSTOLIC BLOOD PRESSURE: 128 MMHG | WEIGHT: 125.2 LBS | BODY MASS INDEX: 20.86 KG/M2 | HEIGHT: 65 IN | DIASTOLIC BLOOD PRESSURE: 76 MMHG

## 2020-05-05 LAB — HBA1C MFR BLD: 6.8 % (ref 4.3–5.7)

## 2020-05-05 PROCEDURE — G8427 DOCREV CUR MEDS BY ELIG CLIN: HCPCS | Performed by: NURSE PRACTITIONER

## 2020-05-05 PROCEDURE — 2022F DILAT RTA XM EVC RTNOPTHY: CPT | Performed by: NURSE PRACTITIONER

## 2020-05-05 PROCEDURE — 4040F PNEUMOC VAC/ADMIN/RCVD: CPT | Performed by: NURSE PRACTITIONER

## 2020-05-05 PROCEDURE — 1036F TOBACCO NON-USER: CPT | Performed by: NURSE PRACTITIONER

## 2020-05-05 PROCEDURE — 1090F PRES/ABSN URINE INCON ASSESS: CPT | Performed by: NURSE PRACTITIONER

## 2020-05-05 PROCEDURE — G8400 PT W/DXA NO RESULTS DOC: HCPCS | Performed by: NURSE PRACTITIONER

## 2020-05-05 PROCEDURE — G8420 CALC BMI NORM PARAMETERS: HCPCS | Performed by: NURSE PRACTITIONER

## 2020-05-05 PROCEDURE — 99214 OFFICE O/P EST MOD 30 MIN: CPT | Performed by: NURSE PRACTITIONER

## 2020-05-05 PROCEDURE — 3017F COLORECTAL CA SCREEN DOC REV: CPT | Performed by: NURSE PRACTITIONER

## 2020-05-05 PROCEDURE — 1123F ACP DISCUSS/DSCN MKR DOCD: CPT | Performed by: NURSE PRACTITIONER

## 2020-05-05 PROCEDURE — 3044F HG A1C LEVEL LT 7.0%: CPT | Performed by: NURSE PRACTITIONER

## 2020-05-05 ASSESSMENT — PATIENT HEALTH QUESTIONNAIRE - PHQ9
SUM OF ALL RESPONSES TO PHQ9 QUESTIONS 1 & 2: 0
SUM OF ALL RESPONSES TO PHQ QUESTIONS 1-9: 0
2. FEELING DOWN, DEPRESSED OR HOPELESS: 0
SUM OF ALL RESPONSES TO PHQ QUESTIONS 1-9: 0
1. LITTLE INTEREST OR PLEASURE IN DOING THINGS: 0

## 2020-05-05 NOTE — PROGRESS NOTES
Assessment/Plan      1. Controlled type 2 diabetes mellitus without complication, without long-term current use of insulin ([facility])  A1C 6.8%, controlled on current medications, continue Metformin. Monitor for acidosis. Call if glucose is consistently around 150 or greater  Increase exercise and water, more attention to diet. Diabetic foot care is important in order to avoid foot wounds:    Never go barefoot even at home. Test your bathwater temperature before you step in. Nails should be trimmed to the shape of the toe. Wash and check your feet for new skin changes every day. Socks should fit snugly, not be tight and be changed every day. Shoes should fit snugly, neither tight nor loose. If deformities or wounds you may need special fitted shoes. Bring meter to next appointment. Yearly dilated eye exam due when able. Due for labs. On ACEi, statin, aspirin.   - Comprehensive Metabolic Panel, Fasting; Future  - Lipid, Fasting; Future  - Microalbumin / Creatinine Urine Ratio; Future    2. Essential hypertension  BP stable. - Comprehensive Metabolic Panel, Fasting; Future  - Lipid, Fasting; Future  - Microalbumin / Creatinine Urine Ratio; Future    3. Hyperlipidemia, unspecified hyperlipidemia type  On statin. Controlled, due for labs. - Comprehensive Metabolic Panel, Fasting; Future  - Lipid, Fasting; Future    4. Persistent albuminuria  On ACEi  - Microalbumin / Creatinine Urine Ratio; Future      Return in about 6 months (around 11/5/2020) for Diabetes. Patient given educational materials - see patient instructions. Discussed use, benefit, and side effects of prescribed medications. All patient questions answered. Pt voiced understanding. Reviewed health maintenance.        Electronically signed KEVIN Chavez CNP on 5/5/20 at 9:21 AM EDT

## 2020-05-14 RX ORDER — METFORMIN HYDROCHLORIDE 500 MG/1
2000 TABLET, EXTENDED RELEASE ORAL
Qty: 360 TABLET | Refills: 1 | Status: SHIPPED | OUTPATIENT
Start: 2020-05-14 | End: 2020-09-16 | Stop reason: SDUPTHER

## 2020-05-25 ASSESSMENT — ENCOUNTER SYMPTOMS
NAUSEA: 0
DIARRHEA: 0
COUGH: 0
CONSTIPATION: 0
CHOKING: 0
TROUBLE SWALLOWING: 0
VOMITING: 0
EYE ITCHING: 0
SHORTNESS OF BREATH: 0
SORE THROAT: 0
ABDOMINAL PAIN: 0

## 2020-09-16 RX ORDER — ATORVASTATIN CALCIUM 10 MG/1
TABLET, FILM COATED ORAL
Qty: 90 TABLET | Refills: 3 | Status: SHIPPED | OUTPATIENT
Start: 2020-09-16 | End: 2021-10-21 | Stop reason: SDUPTHER

## 2020-09-16 RX ORDER — METFORMIN HYDROCHLORIDE 500 MG/1
2000 TABLET, EXTENDED RELEASE ORAL
Qty: 360 TABLET | Refills: 1 | Status: SHIPPED | OUTPATIENT
Start: 2020-09-16 | End: 2021-05-04 | Stop reason: SDUPTHER

## 2020-09-16 RX ORDER — LISINOPRIL 10 MG/1
10 TABLET ORAL DAILY
Qty: 90 TABLET | Refills: 1 | Status: SHIPPED | OUTPATIENT
Start: 2020-09-16 | End: 2021-04-08 | Stop reason: DRUGHIGH

## 2020-10-21 LAB
A/G RATIO: 1.4 (ref 1.5–2.5)
ALBUMIN SERPL-MCNC: 4.2 GM/DL (ref 3.5–5)
ALP BLD-CCNC: 68 IU/L (ref 39–118)
ALT SERPL-CCNC: 12 IU/L (ref 10–40)
ANION GAP SERPL CALCULATED.3IONS-SCNC: 8 MMOL/L (ref 4–12)
AST SERPL-CCNC: 15 IU/L (ref 15–41)
BILIRUB SERPL-MCNC: 0.6 MG/DL (ref 0.2–1)
BUN BLDV-MCNC: 15 MG/DL (ref 7–20)
CALCIUM SERPL-MCNC: 9.3 MG/DL (ref 8.8–10.5)
CHLORIDE BLD-SCNC: 105 MEQ/L (ref 101–111)
CHOLESTEROL/HDL RELATIVE RISK: 1.8 (ref 4–4.4)
CHOLESTEROL: 115 MG/DL
CO2: 27 MEQ/L (ref 21–32)
CREAT SERPL-MCNC: 0.65 MG/DL (ref 0.6–1.3)
CREATININE CLEARANCE: >60
CREATININE, RANDOM URINE: 65.1 MG/DL
DIRECT-LDL / HDL RISK: 0.6
GLUCOSE: 115 MG/DL (ref 70–110)
HDLC SERPL-MCNC: 62 MG/DL
LDL CHOLESTEROL DIRECT: 43 MG/DL
MICROALBUMIN UR-MCNC: 30.9 MG/L
MICROALBUMIN/CREAT UR-RTO: 47.5 MG/GM (ref 0–30)
POTASSIUM SERPL-SCNC: 4 MEQ/L (ref 3.6–5)
SODIUM BLD-SCNC: 140 MEQ/L (ref 135–145)
TOTAL PROTEIN: 7.3 G/DL (ref 6.2–8)
TRIGL SERPL-MCNC: 90 MG/DL
VLDLC SERPL CALC-MCNC: 18 MG/DL

## 2021-01-07 ENCOUNTER — OFFICE VISIT (OUTPATIENT)
Dept: INTERNAL MEDICINE CLINIC | Age: 66
End: 2021-01-07
Payer: MEDICARE

## 2021-01-07 VITALS
HEIGHT: 64 IN | HEART RATE: 78 BPM | DIASTOLIC BLOOD PRESSURE: 76 MMHG | SYSTOLIC BLOOD PRESSURE: 132 MMHG | WEIGHT: 118 LBS | TEMPERATURE: 97.7 F | BODY MASS INDEX: 20.14 KG/M2

## 2021-01-07 DIAGNOSIS — E11.9 CONTROLLED TYPE 2 DIABETES MELLITUS WITHOUT COMPLICATION, WITHOUT LONG-TERM CURRENT USE OF INSULIN (HCC): Primary | ICD-10-CM

## 2021-01-07 DIAGNOSIS — R80.9 PERSISTENT ALBUMINURIA: ICD-10-CM

## 2021-01-07 DIAGNOSIS — I10 ESSENTIAL HYPERTENSION: ICD-10-CM

## 2021-01-07 DIAGNOSIS — E78.2 MIXED HYPERLIPIDEMIA: ICD-10-CM

## 2021-01-07 LAB — HBA1C MFR BLD: 6.4 % (ref 4.3–5.7)

## 2021-01-07 PROCEDURE — 1090F PRES/ABSN URINE INCON ASSESS: CPT | Performed by: NURSE PRACTITIONER

## 2021-01-07 PROCEDURE — 83036 HEMOGLOBIN GLYCOSYLATED A1C: CPT | Performed by: NURSE PRACTITIONER

## 2021-01-07 PROCEDURE — G8484 FLU IMMUNIZE NO ADMIN: HCPCS | Performed by: NURSE PRACTITIONER

## 2021-01-07 PROCEDURE — 99214 OFFICE O/P EST MOD 30 MIN: CPT | Performed by: NURSE PRACTITIONER

## 2021-01-07 PROCEDURE — 1123F ACP DISCUSS/DSCN MKR DOCD: CPT | Performed by: NURSE PRACTITIONER

## 2021-01-07 PROCEDURE — G8420 CALC BMI NORM PARAMETERS: HCPCS | Performed by: NURSE PRACTITIONER

## 2021-01-07 PROCEDURE — 2022F DILAT RTA XM EVC RTNOPTHY: CPT | Performed by: NURSE PRACTITIONER

## 2021-01-07 PROCEDURE — 4040F PNEUMOC VAC/ADMIN/RCVD: CPT | Performed by: NURSE PRACTITIONER

## 2021-01-07 PROCEDURE — 1036F TOBACCO NON-USER: CPT | Performed by: NURSE PRACTITIONER

## 2021-01-07 PROCEDURE — 3017F COLORECTAL CA SCREEN DOC REV: CPT | Performed by: NURSE PRACTITIONER

## 2021-01-07 PROCEDURE — 3044F HG A1C LEVEL LT 7.0%: CPT | Performed by: NURSE PRACTITIONER

## 2021-01-07 PROCEDURE — G8400 PT W/DXA NO RESULTS DOC: HCPCS | Performed by: NURSE PRACTITIONER

## 2021-01-07 PROCEDURE — G8427 DOCREV CUR MEDS BY ELIG CLIN: HCPCS | Performed by: NURSE PRACTITIONER

## 2021-01-07 ASSESSMENT — PATIENT HEALTH QUESTIONNAIRE - PHQ9
SUM OF ALL RESPONSES TO PHQ QUESTIONS 1-9: 2
SUM OF ALL RESPONSES TO PHQ9 QUESTIONS 1 & 2: 2
1. LITTLE INTEREST OR PLEASURE IN DOING THINGS: 1

## 2021-01-07 NOTE — PROGRESS NOTES
[facility]  [address] [facility] [address]  Dept: [phone]  Dept Fax: [phone] : [phone]     Visit Date:  1/7/2021    Patient:  Rylee Perez  YOB: 1955    HPI:     Chief Complaint   Patient presents with    Diabetes       HPI    DM - On Metformin 2000 mg daily, Lisinopril 10 mg daily, aspirin daily. A1C 6.4%. HLD - On Lipitor 10 mg daily. Results for Milderd Lady (MRN [id number]) as of 1/7/2021 08:59   Ref. Range 10/21/2020 08:04   CHOLESTEROL/HDL RELATIVE RISK Latest Ref Range: 4.0 - 4.4  1.8 (L)   Direct-LDL / HDL Risk Latest Ref Range: <3.1  0.6   Cholesterol Latest Ref Range: <200 mg/dL 115   HDL Cholesterol Latest Units: mg/dL 62   LDL Direct Latest Units: mg/dL 43   Triglycerides Latest Ref Range: <150 mg/dL 90   VLDL Latest Ref Range: <39 mg/dL 18     /76. Medications    Current Outpatient Medications:     metFORMIN (GLUCOPHAGE-XR) 500 MG extended release tablet, Take 4 tablets by mouth daily (with breakfast), Disp: 360 tablet, Rfl: 1    lisinopril (PRINIVIL;ZESTRIL) 10 MG tablet, Take 1 tablet by mouth daily, Disp: 90 tablet, Rfl: 1    atorvastatin (LIPITOR) 10 MG tablet, TAKE 1 TABLET BY MOUTH ONE TIME A DAY, Disp: 90 tablet, Rfl: 3    Multiple Vitamins-Minerals (THERAPEUTIC MULTIVITAMIN-MINERALS) tablet, Take 1 tablet by mouth daily, Disp: , Rfl:     aspirin 81 MG tablet, Take 1 tablet by mouth daily, Disp: 90 tablet, Rfl: 3    The patient has No Known Allergies. Past Medical History  Amie Bravo  has a past medical history of Hyperlipidemia, Hypertension, and Type II or unspecified type diabetes mellitus without mention of complication, not stated as uncontrolled. Past Surgical History  The patient  has no past surgical history on file. Family History  This patient's family history includes Diabetes in her mother; Heart Disease in her father.     Social History  Tavo Caceres  reports that she has never smoked. She has never used smokeless tobacco. She reports that she does not drink alcohol or use drugs. Health Maintenance:    Health Maintenance   Topic Date Due    Hepatitis C screen  1955    DTaP/Tdap/Td vaccine (1 - Tdap) 03/13/1972    Breast cancer screen  03/13/2003    Shingles Vaccine (1 of 2) 03/13/2003    Colon cancer screen colonoscopy  03/13/2003    DEXA (modify frequency per FRAX score)  03/13/2008    Diabetic retinal exam  11/12/2015    Pneumococcal 65+ years Vaccine (1 of 1 - PPSV23) 03/13/2018    Annual Wellness Visit (AWV)  05/29/2019    Diabetic foot exam  05/20/2020    Flu vaccine (1) 09/01/2020    Diabetic microalbuminuria test  10/21/2021    Lipid screen  10/21/2021    Potassium monitoring  10/21/2021    Creatinine monitoring  10/21/2021    A1C test (Diabetic or Prediabetic)  01/07/2022    Hepatitis A vaccine  Aged Out    Hib vaccine  Aged Out    Meningococcal (ACWY) vaccine  Aged Out       Subjective:      Review of Systems   Constitutional: Negative for chills, fatigue and fever. Respiratory: Negative for cough and shortness of breath. Cardiovascular: Negative for chest pain and leg swelling. Gastrointestinal: Negative for abdominal pain, constipation, diarrhea, nausea and vomiting. Endocrine: Negative for polydipsia and polyphagia. Genitourinary: Negative for difficulty urinating and dysuria. Musculoskeletal: Negative for arthralgias and myalgias. Skin: Negative for rash and wound. Neurological: Negative for numbness. Objective:     /76 (Site: Right Upper Arm, Position: Sitting, Cuff Size: Small Adult)   Pulse 78   Temp 97.7 °F (36.5 °C) (Temporal)   Ht 5' 4\" (1.626 m)   Wt 118 lb (53.5 kg)   BMI 20.25 kg/m²     Physical Exam  Vitals signs reviewed. Constitutional:       General: She is not in acute distress. Appearance: She is well-developed. She is not diaphoretic.    HENT: Head: Normocephalic and atraumatic. Neck:      Thyroid: No thyromegaly. Cardiovascular:      Rate and Rhythm: Normal rate and regular rhythm. Heart sounds: Normal heart sounds. No murmur. No friction rub. No gallop. Pulmonary:      Effort: Pulmonary effort is normal. No respiratory distress. Breath sounds: Normal breath sounds. No wheezing or rales. Abdominal:      General: There is no distension. Palpations: Abdomen is soft. Tenderness: There is no abdominal tenderness. Musculoskeletal:         General: No tenderness or deformity. Skin:     General: Skin is warm and dry. Coloration: Skin is not pale. Findings: No erythema. Neurological:      Mental Status: She is alert and oriented to person, place, and time. Cranial Nerves: No cranial nerve deficit. Labs Reviewed 1/7/2021:    Lab Results   Component Value Date    WBC 7.5 10/18/2018    HGB 12.8 10/18/2018    HCT 39.2 10/18/2018     10/18/2018    CHOL 115 10/21/2020    TRIG 90 10/21/2020    HDL 62 10/21/2020    LDLDIRECT 43 10/21/2020    ALT 12 10/21/2020    AST 15 10/21/2020     10/21/2020    K 4.0 10/21/2020     10/21/2020    CREATININE 0.65 10/21/2020    BUN 15 10/21/2020    CO2 27 10/21/2020    TSH 2.810 10/18/2018    GLUF 137 (H) 03/05/2020    LABA1C 6.4 (H) 01/07/2021    LABMICR 30.9 10/21/2020       Assessment/Plan      1. Controlled type 2 diabetes mellitus without complication, without long-term current use of insulin (HCC)  On Metformin 2000 mg daily, continue current dose. A1C 6.4 %  On ACEi, aspirin, statin. Yearly dilated eye exams  Regular foot exams - complete at next visit. Follow up in 3 months.   - POCT glycosylated hemoglobin (Hb A1C)  - 76922 - Collection Capillary Blood Specimen    2. Essential hypertension  /76. Stable. - Basic Metabolic Panel; Future    3. Mixed hyperlipidemia  Controlled on Lipitor 10 mg daily.      4. Persistent albuminuria  MACR up to 47.5 mg on Lisinopril 10 mg daily. Increase Lisinopril to 20 mg daily. Take 2 of the ones you have. Check BP twice daily and call into the office next week. If you feel light headed, dizzy, or your BP drops below 110, go back to 10 mg daily. Will plan if you tolerate to send 20 mg dose to your pharmacy. Check blood work in one month due to increased dose. Will recheck urine microalbumin in 3 months. - Basic Metabolic Panel; Future      Return in about 3 months (around 4/7/2021) for Diabetes. Patient given educational materials - see patient instructions. Discussed use, benefit, and side effects of prescribed medications. All patient questions answered. Pt voiced understanding.        Electronically signed KEVIN Morrow CNP on 1/7/21 at 8:36 AM EST

## 2021-01-07 NOTE — PATIENT INSTRUCTIONS
Increase Lisinopril to 20 mg daily. Take 2 of the ones you have. Check BP twice daily and call into the office next week. If you feel light headed, dizzy, or your BP drops below 110, go back to 10 mg daily. Will plan if you tolerate to send 20 mg dose to your pharmacy. Check blood work in one month due to increased dose. Will recheck urine microalbumin in 3 months.

## 2021-01-22 ENCOUNTER — TELEPHONE (OUTPATIENT)
Dept: INTERNAL MEDICINE CLINIC | Age: 66
End: 2021-01-22

## 2021-01-22 NOTE — TELEPHONE ENCOUNTER
LPN received call from patient with her morning BP readings.     1/8/21- 121/75 P 73    1/11//75 P 70    1/12/21- 94/71 P 75    1/13/21- 134/65 P 76    1/15/21- 96/64 P73    1/16/21- 111/77 P 75    1/17/21- 103/61 P 71    1/18/21- 108/70 P74    1/19/21- 110/64 P73    1/20/21- 129/69 P 77    1/21/21- 110/68 P 75    1/22/21- 108/67 P 71

## 2021-01-25 ASSESSMENT — ENCOUNTER SYMPTOMS
NAUSEA: 0
ABDOMINAL PAIN: 0
CONSTIPATION: 0
DIARRHEA: 0
VOMITING: 0
SHORTNESS OF BREATH: 0
COUGH: 0

## 2021-02-10 DIAGNOSIS — E11.9 CONTROLLED TYPE 2 DIABETES MELLITUS WITHOUT COMPLICATION, WITHOUT LONG-TERM CURRENT USE OF INSULIN (HCC): Primary | ICD-10-CM

## 2021-02-10 DIAGNOSIS — I10 ESSENTIAL HYPERTENSION: ICD-10-CM

## 2021-02-10 RX ORDER — LISINOPRIL 20 MG/1
20 TABLET ORAL DAILY
Qty: 90 TABLET | Refills: 1 | Status: SHIPPED | OUTPATIENT
Start: 2021-02-10 | End: 2021-03-08 | Stop reason: SDUPTHER

## 2021-03-08 DIAGNOSIS — I10 ESSENTIAL HYPERTENSION: ICD-10-CM

## 2021-03-08 DIAGNOSIS — E11.9 CONTROLLED TYPE 2 DIABETES MELLITUS WITHOUT COMPLICATION, WITHOUT LONG-TERM CURRENT USE OF INSULIN (HCC): ICD-10-CM

## 2021-03-08 RX ORDER — LISINOPRIL 20 MG/1
20 TABLET ORAL DAILY
Qty: 90 TABLET | Refills: 1 | Status: SHIPPED | OUTPATIENT
Start: 2021-03-08 | End: 2021-10-21 | Stop reason: SDUPTHER

## 2021-03-29 LAB
ANION GAP SERPL CALCULATED.3IONS-SCNC: 6 MMOL/L (ref 4–12)
BUN BLDV-MCNC: 14 MG/DL (ref 7–20)
CALCIUM SERPL-MCNC: 9.6 MG/DL (ref 8.8–10.5)
CHLORIDE BLD-SCNC: 107 MEQ/L (ref 101–111)
CO2: 28 MEQ/L (ref 21–32)
CREAT SERPL-MCNC: 0.77 MG/DL (ref 0.6–1.3)
CREATININE CLEARANCE: >60
GLUCOSE: 130 MG/DL (ref 70–110)
POTASSIUM SERPL-SCNC: 4.2 MEQ/L (ref 3.6–5)
SODIUM BLD-SCNC: 141 MEQ/L (ref 135–145)

## 2021-04-08 ENCOUNTER — OFFICE VISIT (OUTPATIENT)
Dept: INTERNAL MEDICINE CLINIC | Age: 66
End: 2021-04-08
Payer: MEDICARE

## 2021-04-08 VITALS
WEIGHT: 115.8 LBS | SYSTOLIC BLOOD PRESSURE: 128 MMHG | DIASTOLIC BLOOD PRESSURE: 72 MMHG | TEMPERATURE: 97.7 F | HEIGHT: 64 IN | BODY MASS INDEX: 19.77 KG/M2

## 2021-04-08 DIAGNOSIS — R80.9 PERSISTENT ALBUMINURIA: ICD-10-CM

## 2021-04-08 DIAGNOSIS — E78.2 MIXED HYPERLIPIDEMIA: ICD-10-CM

## 2021-04-08 DIAGNOSIS — E11.9 CONTROLLED TYPE 2 DIABETES MELLITUS WITHOUT COMPLICATION, WITHOUT LONG-TERM CURRENT USE OF INSULIN (HCC): Primary | ICD-10-CM

## 2021-04-08 DIAGNOSIS — I10 ESSENTIAL HYPERTENSION: ICD-10-CM

## 2021-04-08 LAB — HBA1C MFR BLD: 6.6 % (ref 4.3–5.7)

## 2021-04-08 PROCEDURE — 99214 OFFICE O/P EST MOD 30 MIN: CPT | Performed by: NURSE PRACTITIONER

## 2021-04-08 PROCEDURE — G8427 DOCREV CUR MEDS BY ELIG CLIN: HCPCS | Performed by: NURSE PRACTITIONER

## 2021-04-08 PROCEDURE — 4040F PNEUMOC VAC/ADMIN/RCVD: CPT | Performed by: NURSE PRACTITIONER

## 2021-04-08 PROCEDURE — G8420 CALC BMI NORM PARAMETERS: HCPCS | Performed by: NURSE PRACTITIONER

## 2021-04-08 PROCEDURE — 2022F DILAT RTA XM EVC RTNOPTHY: CPT | Performed by: NURSE PRACTITIONER

## 2021-04-08 PROCEDURE — 93000 ELECTROCARDIOGRAM COMPLETE: CPT | Performed by: NURSE PRACTITIONER

## 2021-04-08 PROCEDURE — 3017F COLORECTAL CA SCREEN DOC REV: CPT | Performed by: NURSE PRACTITIONER

## 2021-04-08 PROCEDURE — 3044F HG A1C LEVEL LT 7.0%: CPT | Performed by: NURSE PRACTITIONER

## 2021-04-08 PROCEDURE — 1123F ACP DISCUSS/DSCN MKR DOCD: CPT | Performed by: NURSE PRACTITIONER

## 2021-04-08 PROCEDURE — 1090F PRES/ABSN URINE INCON ASSESS: CPT | Performed by: NURSE PRACTITIONER

## 2021-04-08 PROCEDURE — G8400 PT W/DXA NO RESULTS DOC: HCPCS | Performed by: NURSE PRACTITIONER

## 2021-04-08 PROCEDURE — 83036 HEMOGLOBIN GLYCOSYLATED A1C: CPT | Performed by: NURSE PRACTITIONER

## 2021-04-08 PROCEDURE — 1036F TOBACCO NON-USER: CPT | Performed by: NURSE PRACTITIONER

## 2021-04-08 NOTE — PATIENT INSTRUCTIONS
Monitor BP once or twice a week, if higher than 150 or 90 call for an appointment. Monitor glucose daily, call if starting to rise towards 150. Fasting labs prior to next appointment in October, on or after 10/21/21.

## 2021-04-08 NOTE — PROGRESS NOTES
Anoop Colon  INTERNAL MEDICINE  [address] [facility] [address]  Dept: [phone]  Dept Fax: [phone]  Loc: [phone]     Visit Date:  4/8/2021    Patient:  Jia Torres  YOB: 1955    HPI:     Chief Complaint   Patient presents with    3 Month Follow-Up    Diabetes    Hypertension    Hyperlipidemia       HPI    DM - A1C 6.4% 1/7/21, now 6.6% today. On Metformin 2000 mg daily. Reports glucose results 120-130s fasting in am.  Glucose 130 on last labs 3/29/2021. Last MACR 47.5 10/21/20. On Lisinopril. Due for dilated eye exam. Advised to schedule. Heart murmur - EF 65%, with mild aortic sclerosis on echo 9/20/19. HTN - /72. On Lisinopril 20 mg daily. HLD - On Lipitor 10 mg daily. Results for Chitra Ovalles (MRN [id number]) as of 4/8/2021 09:01   Ref. Range 10/21/2020 08:04   CHOLESTEROL/HDL RELATIVE RISK Latest Ref Range: 4.0 - 4.4  1.8 (L)   Direct-LDL / HDL Risk Latest Ref Range: <3.1  0.6   Cholesterol Latest Ref Range: <200 mg/dL 115   HDL Cholesterol Latest Units: mg/dL 62   LDL Direct Latest Units: mg/dL 43   Triglycerides Latest Ref Range: <150 mg/dL 90   VLDL Latest Ref Range: <39 mg/dL 18     She and her  are doing counseling now, she thinks it is helping and she is feeling better today.      Medications    Current Outpatient Medications:     lisinopril (PRINIVIL;ZESTRIL) 20 MG tablet, Take 1 tablet by mouth daily, Disp: 90 tablet, Rfl: 1    metFORMIN (GLUCOPHAGE-XR) 500 MG extended release tablet, Take 4 tablets by mouth daily (with breakfast), Disp: 360 tablet, Rfl: 1    atorvastatin (LIPITOR) 10 MG tablet, TAKE 1 TABLET BY MOUTH ONE TIME A DAY, Disp: 90 tablet, Rfl: 3    Multiple Vitamins-Minerals (THERAPEUTIC MULTIVITAMIN-MINERALS) tablet, Take 1 tablet by mouth daily, Disp: , Rfl:     aspirin 81 MG tablet, Take 1 tablet by mouth daily, Disp: 90 tablet, Rfl: 3    The patient has No Known Allergies. Past Medical History  Myriam Barnhart  has a past medical history of Hyperlipidemia, Hypertension, and Type II or unspecified type diabetes mellitus without mention of complication, not stated as uncontrolled. Past Surgical History  The patient  has no past surgical history on file. Family History  This patient's family history includes Diabetes in her mother; Heart Disease in her father. Social History  Myriam Barnhart  reports that she has never smoked. She has never used smokeless tobacco. She reports that she does not drink alcohol or use drugs. Health Maintenance:    Health Maintenance   Topic Date Due    Hepatitis C screen  Never done    COVID-19 Vaccine (1) Never done    DTaP/Tdap/Td vaccine (1 - Tdap) Never done    Breast cancer screen  Never done    Shingles Vaccine (1 of 2) Never done    Colon cancer screen colonoscopy  Never done    DEXA (modify frequency per FRAX score)  Never done    Diabetic retinal exam  11/12/2015    Pneumococcal 65+ years Vaccine (1 of 1 - PPSV23) Never done   [employer] Visit (AWV)  Never done    Diabetic foot exam  05/20/2020    Flu vaccine (Season Ended) 09/01/2021    Diabetic microalbuminuria test  10/21/2021    Lipid screen  10/21/2021    Potassium monitoring  03/29/2022    Creatinine monitoring  03/29/2022    A1C test (Diabetic or Prediabetic)  04/08/2022    Hepatitis A vaccine  Aged Out    Hib vaccine  Aged Out    Meningococcal (ACWY) vaccine  Aged Out       Subjective:      Review of Systems   Constitutional: Negative for chills, fatigue and fever. Respiratory: Negative for cough and shortness of breath. Cardiovascular: Negative for chest pain and leg swelling. Gastrointestinal: Negative for abdominal pain, constipation, diarrhea, nausea and vomiting. Endocrine: Negative for polydipsia and polyphagia. Genitourinary: Negative for difficulty urinating and dysuria.    Musculoskeletal: Negative for arthralgias and 6.6%  Monitor daily fasting glucose, call if rising towards 150 and will adjust medication. Due for fasting labs, collect prior to next appt in 6 months.   - POCT glycosylated hemoglobin (Hb A1C)  - Basic Metabolic Panel; Future  - Microalbumin / Creatinine Urine Ratio; Future    2. Essential hypertension  Bp today 128/72. EKG today sinus rhythm, rate 72, QTc 371. Advised to monitor once to twice a week and call if > 150/90.   - Microalbumin / Creatinine Urine Ratio; Future  - EKG 12 Lead    3. Mixed hyperlipidemia  Due for lipids. On statin therapy. - Lipid, Fasting; Future    4. Persistent albuminuria  On ACEi. Recheck urine in 6 months. - Microalbumin / Creatinine Urine Ratio; Future      Return in about 28 weeks (around 10/21/2021) for Diabetes, HTN, HLD. Patient given educational materials - see patient instructions. Discussed use, benefit, and side effects of prescribed medications. All patient questions answered. Pt voiced understanding.       Electronically signed KEVIN Romano CNP on 4/8/21 at 9:01 AM EDT

## 2021-04-21 ASSESSMENT — ENCOUNTER SYMPTOMS
NAUSEA: 0
ABDOMINAL PAIN: 0
VOMITING: 0
CONSTIPATION: 0
COUGH: 0
SHORTNESS OF BREATH: 0
DIARRHEA: 0

## 2021-05-04 DIAGNOSIS — E11.9 CONTROLLED TYPE 2 DIABETES MELLITUS WITHOUT COMPLICATION, WITHOUT LONG-TERM CURRENT USE OF INSULIN (HCC): ICD-10-CM

## 2021-05-04 RX ORDER — METFORMIN HYDROCHLORIDE 500 MG/1
2000 TABLET, EXTENDED RELEASE ORAL
Qty: 360 TABLET | Refills: 1 | Status: SHIPPED | OUTPATIENT
Start: 2021-05-04 | End: 2021-10-21 | Stop reason: SDUPTHER

## 2021-05-04 NOTE — TELEPHONE ENCOUNTER
[address]  faxed request. 90 day supply. Next appt 10/21 with Dr. Anton Pink. Previous [facility] pt.

## 2021-10-21 ENCOUNTER — OFFICE VISIT (OUTPATIENT)
Dept: INTERNAL MEDICINE CLINIC | Age: 66
End: 2021-10-21
Payer: MEDICARE

## 2021-10-21 VITALS — BODY MASS INDEX: 18.71 KG/M2 | TEMPERATURE: 98 F | WEIGHT: 109 LBS

## 2021-10-21 DIAGNOSIS — E11.9 CONTROLLED TYPE 2 DIABETES MELLITUS WITHOUT COMPLICATION, WITHOUT LONG-TERM CURRENT USE OF INSULIN (HCC): Primary | ICD-10-CM

## 2021-10-21 DIAGNOSIS — I10 ESSENTIAL HYPERTENSION: ICD-10-CM

## 2021-10-21 DIAGNOSIS — E78.5 HYPERLIPIDEMIA, UNSPECIFIED HYPERLIPIDEMIA TYPE: ICD-10-CM

## 2021-10-21 LAB — HBA1C MFR BLD: 6.4 % (ref 4.3–5.7)

## 2021-10-21 PROCEDURE — 4040F PNEUMOC VAC/ADMIN/RCVD: CPT | Performed by: INTERNAL MEDICINE

## 2021-10-21 PROCEDURE — G8427 DOCREV CUR MEDS BY ELIG CLIN: HCPCS | Performed by: INTERNAL MEDICINE

## 2021-10-21 PROCEDURE — G8484 FLU IMMUNIZE NO ADMIN: HCPCS | Performed by: INTERNAL MEDICINE

## 2021-10-21 PROCEDURE — 1090F PRES/ABSN URINE INCON ASSESS: CPT | Performed by: INTERNAL MEDICINE

## 2021-10-21 PROCEDURE — 83036 HEMOGLOBIN GLYCOSYLATED A1C: CPT | Performed by: INTERNAL MEDICINE

## 2021-10-21 PROCEDURE — 99213 OFFICE O/P EST LOW 20 MIN: CPT | Performed by: INTERNAL MEDICINE

## 2021-10-21 PROCEDURE — 1123F ACP DISCUSS/DSCN MKR DOCD: CPT | Performed by: INTERNAL MEDICINE

## 2021-10-21 PROCEDURE — 3017F COLORECTAL CA SCREEN DOC REV: CPT | Performed by: INTERNAL MEDICINE

## 2021-10-21 PROCEDURE — G8420 CALC BMI NORM PARAMETERS: HCPCS | Performed by: INTERNAL MEDICINE

## 2021-10-21 PROCEDURE — 1036F TOBACCO NON-USER: CPT | Performed by: INTERNAL MEDICINE

## 2021-10-21 PROCEDURE — G8400 PT W/DXA NO RESULTS DOC: HCPCS | Performed by: INTERNAL MEDICINE

## 2021-10-21 PROCEDURE — 3044F HG A1C LEVEL LT 7.0%: CPT | Performed by: INTERNAL MEDICINE

## 2021-10-21 PROCEDURE — 2022F DILAT RTA XM EVC RTNOPTHY: CPT | Performed by: INTERNAL MEDICINE

## 2021-10-21 RX ORDER — METFORMIN HYDROCHLORIDE 500 MG/1
2000 TABLET, EXTENDED RELEASE ORAL
Qty: 360 TABLET | Refills: 2 | Status: SHIPPED | OUTPATIENT
Start: 2021-10-21 | End: 2022-06-30 | Stop reason: SDUPTHER

## 2021-10-21 RX ORDER — LISINOPRIL 20 MG/1
20 TABLET ORAL DAILY
Qty: 90 TABLET | Refills: 2 | Status: SHIPPED | OUTPATIENT
Start: 2021-10-21 | End: 2022-02-15

## 2021-10-21 RX ORDER — ATORVASTATIN CALCIUM 10 MG/1
TABLET, FILM COATED ORAL
Qty: 90 TABLET | Refills: 2 | Status: SHIPPED | OUTPATIENT
Start: 2021-10-21 | End: 2022-06-30 | Stop reason: SDUPTHER

## 2021-10-21 SDOH — ECONOMIC STABILITY: FOOD INSECURITY: WITHIN THE PAST 12 MONTHS, YOU WORRIED THAT YOUR FOOD WOULD RUN OUT BEFORE YOU GOT MONEY TO BUY MORE.: NEVER TRUE

## 2021-10-21 SDOH — ECONOMIC STABILITY: FOOD INSECURITY: WITHIN THE PAST 12 MONTHS, THE FOOD YOU BOUGHT JUST DIDN'T LAST AND YOU DIDN'T HAVE MONEY TO GET MORE.: NEVER TRUE

## 2021-10-21 ASSESSMENT — ENCOUNTER SYMPTOMS
CONSTIPATION: 0
NAUSEA: 0
ABDOMINAL PAIN: 0
COUGH: 0
VOMITING: 0
DIARRHEA: 0
SHORTNESS OF BREATH: 0

## 2021-10-21 ASSESSMENT — SOCIAL DETERMINANTS OF HEALTH (SDOH): HOW HARD IS IT FOR YOU TO PAY FOR THE VERY BASICS LIKE FOOD, HOUSING, MEDICAL CARE, AND HEATING?: NOT VERY HARD

## 2021-10-21 NOTE — PROGRESS NOTES
Anoop Colon [facility] INTERNAL MEDICINE  [address] [facility] [address]  Dept: [phone]  Dept Fax: [phone] : [phone]     Visit Date:  10/21/2021    Patient:  Ortiz Escobar  YOB: 1955    HPI:     Chief Complaint   Patient presents with    Diabetes     A1C today- Needs foot and eye exam. Pt to call and schedule eye exam. Has not been since covid.  Hypertension    Hyperlipidemia       Diabetes  Pertinent negatives for diabetes include no chest pain, no fatigue, no polydipsia and no polyphagia. Hypertension  Pertinent negatives include no chest pain or shortness of breath. Hyperlipidemia  Pertinent negatives include no chest pain, myalgias or shortness of breath. Matias Perez is a pleasant 55-year-old female with history of above issues I have seen her almost 3 to 4 years back, and last seen by Robinson Webster CNP 6 months ago. Records have been reviewed and following up with her today. A1c is great at 6.4 similar to the previous visit, she denies any chest pain shortness of breath no syncopal episodes. She does exercise on a regular basis. She does not want to get the covid vaccine, per her [employer]. DM - A1C 6.4% 1/7/21, now 6.4% today. On Metformin 2000 mg daily. Reports glucose results 120-130s fasting in am.     The recent blood work was on 10/15/2021, glucose was 92 with normal BUN/creatinine. Last MACR 47.5 10/21/20. On Lisinopril. Due for dilated eye exam. Advised to schedule. Heart murmur - EF 65%, with mild aortic sclerosis on echo 9/20/19. HTN -stable on Lisinopril 20 mg daily. ,  Continue with the same    HLD - On Lipitor 10 mg daily. ,  Tolerating statins well and will continue current dose. Results for Josee Ochoa (MRN [id number]) as of 4/8/2021 09:01   Ref.  Range 10/21/2020 08:04   CHOLESTEROL/HDL RELATIVE RISK Latest Ref Range: 4.0 - 4.4  1.8 (L)   Direct-LDL / HDL Risk Latest years Vaccine (1 of 1 - PPSV23) Never done   [employer] Visit (AWV)  Never done    Diabetic foot exam  05/20/2020    Flu vaccine (1) Never done    Diabetic microalbuminuria test  10/15/2022    Lipid screen  10/15/2022    Potassium monitoring  10/15/2022    Creatinine monitoring  10/15/2022    A1C test (Diabetic or Prediabetic)  10/21/2022    Hepatitis A vaccine  Aged Out    Hib vaccine  Aged Out    Meningococcal (ACWY) vaccine  Aged Out       Subjective:      Review of Systems   Constitutional: Negative for chills, fatigue and fever. Respiratory: Negative for cough and shortness of breath. Cardiovascular: Negative for chest pain and leg swelling. Gastrointestinal: Negative for abdominal pain, constipation, diarrhea, nausea and vomiting. Endocrine: Negative for polydipsia and polyphagia. Genitourinary: Negative for difficulty urinating and dysuria. Musculoskeletal: Negative for arthralgias and myalgias. Skin: Negative for rash and wound. Neurological: Negative for numbness. Objective:     Temp 98 °F (36.7 °C)   Wt 109 lb (49.4 kg)   BMI 18.71 kg/m²     Physical Exam  Vitals reviewed. Constitutional:       General: She is not in acute distress. Appearance: She is well-developed. She is not diaphoretic. HENT:      Head: Normocephalic and atraumatic. Neck:      Thyroid: No thyromegaly. Cardiovascular:      Rate and Rhythm: Normal rate and regular rhythm. Heart sounds: Normal heart sounds. No murmur heard. No friction rub. No gallop. Pulmonary:      Effort: Pulmonary effort is normal. No respiratory distress. Breath sounds: Normal breath sounds. No wheezing or rales. Abdominal:      General: There is no distension. Palpations: Abdomen is soft. Tenderness: There is no abdominal tenderness. Musculoskeletal:         General: No tenderness or deformity. Skin:     General: Skin is warm and dry. Coloration: Skin is not pale. Findings: No erythema. Neurological:      Mental Status: She is alert and oriented to person, place, and time. Cranial Nerves: No cranial nerve deficit. Labs Reviewed 10/21/2021:    Lab Results   Component Value Date    WBC 7.5 10/18/2018    HGB 12.8 10/18/2018    HCT 39.2 10/18/2018     10/18/2018    CHOL 126 10/15/2021    TRIG 72 10/15/2021    HDL 71 10/15/2021    LDLDIRECT 46 10/15/2021    ALT 12 10/21/2020    AST 15 10/21/2020     10/15/2021    K 3.8 10/15/2021     10/15/2021    CREATININE 0.78 10/15/2021    BUN 19 10/15/2021    CO2 27 10/15/2021    TSH 2.810 10/18/2018    GLUF 137 (H) 03/05/2020    LABA1C 6.4 (H) 10/21/2021    LABMICR 24.3 10/15/2021       DIABETIC FOOT EXAM    SENSATION: intact  PROPRIOCEPTION: intact  VIBRATORY SENSE: intact  No  ulcers, erythema or other lesions noted  Pulses present and normal      Assessment/Plan      1. Controlled type 2 diabetes mellitus without complication, without long-term current use of insulin (HCC)  Well-controlled, on Metformin  Monitor daily fasting glucose, call if rising towards 150 and will adjust medication. Due for fasting labs, collect prior to next appt in 6 months.   - POCT glycosylated hemoglobin (Hb A1C)  - Basic Metabolic Panel; Future  - Microalbumin / Creatinine Urine Ratio; Future    2. Essential hypertension  Pressure stable on the current regimen  Last EKG noted from 4/21    3. Mixed hyperlipidemia  Continue with lipitor 10 mg daily .    - Lipid, Fasting; Future    4. Persistent albuminuria  On ACEi. Recheck urine in 6 months. - Microalbumin / Creatinine Urine Ratio; was normal on this visit    Reevaluate in 6 months      Patient given educational materials - see patient instructions. Discussed use, benefit, and side effects of prescribed medications. All patient questions answered. Pt voiced understanding. I spoke with her regarding screening colon and mammogram, she never got one so far.  And also

## 2022-01-04 ENCOUNTER — OFFICE VISIT (OUTPATIENT)
Dept: INTERNAL MEDICINE CLINIC | Age: 67
End: 2022-01-04
Payer: MEDICARE

## 2022-01-04 VITALS
BODY MASS INDEX: 22.74 KG/M2 | DIASTOLIC BLOOD PRESSURE: 90 MMHG | HEIGHT: 64 IN | SYSTOLIC BLOOD PRESSURE: 160 MMHG | HEART RATE: 92 BPM | WEIGHT: 133.2 LBS | TEMPERATURE: 98.2 F

## 2022-01-04 DIAGNOSIS — G30.9 ALZHEIMER'S DISEASE, UNSPECIFIED (CODE) (HCC): Primary | ICD-10-CM

## 2022-01-04 DIAGNOSIS — I10 PRIMARY HYPERTENSION: ICD-10-CM

## 2022-01-04 DIAGNOSIS — R09.89 BRUIT OF RIGHT CAROTID ARTERY: ICD-10-CM

## 2022-01-04 DIAGNOSIS — E11.49 CONTROLLED TYPE 2 DIABETES MELLITUS WITH OTHER NEUROLOGIC COMPLICATION, UNSPECIFIED WHETHER LONG TERM INSULIN USE (HCC): ICD-10-CM

## 2022-01-04 PROCEDURE — 4040F PNEUMOC VAC/ADMIN/RCVD: CPT | Performed by: INTERNAL MEDICINE

## 2022-01-04 PROCEDURE — G8484 FLU IMMUNIZE NO ADMIN: HCPCS | Performed by: INTERNAL MEDICINE

## 2022-01-04 PROCEDURE — 3017F COLORECTAL CA SCREEN DOC REV: CPT | Performed by: INTERNAL MEDICINE

## 2022-01-04 PROCEDURE — 1090F PRES/ABSN URINE INCON ASSESS: CPT | Performed by: INTERNAL MEDICINE

## 2022-01-04 PROCEDURE — G8420 CALC BMI NORM PARAMETERS: HCPCS | Performed by: INTERNAL MEDICINE

## 2022-01-04 PROCEDURE — 1036F TOBACCO NON-USER: CPT | Performed by: INTERNAL MEDICINE

## 2022-01-04 PROCEDURE — 99214 OFFICE O/P EST MOD 30 MIN: CPT | Performed by: INTERNAL MEDICINE

## 2022-01-04 PROCEDURE — G8400 PT W/DXA NO RESULTS DOC: HCPCS | Performed by: INTERNAL MEDICINE

## 2022-01-04 PROCEDURE — G8427 DOCREV CUR MEDS BY ELIG CLIN: HCPCS | Performed by: INTERNAL MEDICINE

## 2022-01-04 PROCEDURE — 1123F ACP DISCUSS/DSCN MKR DOCD: CPT | Performed by: INTERNAL MEDICINE

## 2022-01-04 PROCEDURE — 2022F DILAT RTA XM EVC RTNOPTHY: CPT | Performed by: INTERNAL MEDICINE

## 2022-01-04 PROCEDURE — 3046F HEMOGLOBIN A1C LEVEL >9.0%: CPT | Performed by: INTERNAL MEDICINE

## 2022-01-04 RX ORDER — MEMANTINE HYDROCHLORIDE 5 MG/1
5 TABLET ORAL 2 TIMES DAILY
Qty: 60 TABLET | Refills: 3 | Status: SHIPPED | OUTPATIENT
Start: 2022-01-04 | End: 2022-06-30

## 2022-01-04 ASSESSMENT — ENCOUNTER SYMPTOMS
DIARRHEA: 0
VOMITING: 0
SHORTNESS OF BREATH: 0
ABDOMINAL PAIN: 0
CONSTIPATION: 0
COUGH: 0
NAUSEA: 0

## 2022-01-04 NOTE — PROGRESS NOTES
Anoop Colon [facility] INTERNAL MEDICINE  [address] [facility] [address]  Dept: [phone]  Dept Fax: [phone]  Loc: [phone]     Visit Date:  1/4/2022    Patient:  Gallo Painter  YOB: 1955    HPI:     Chief Complaint   Patient presents with    Memory Loss     forgetting things , burning trash in house , getting into anybody's car        She is here today , with family due to above. She has been a diabetic for some time, mother of 3 kids. Admits to forgetting things getting worse, no CP or SOB. Here with spouse, and daughter, and grand daughter. She is a non smoker, no fever or chills, no recent visual disturbances, no HA . She does not   Believe in flu or covid vaccines. Today we have done a mini mental , its total score of 20 . Diabetes  Pertinent negatives for diabetes include no chest pain, no fatigue, no polydipsia and no polyphagia. Hypertension  Pertinent negatives include no chest pain or shortness of breath. Hyperlipidemia  Pertinent negatives include no chest pain, myalgias or shortness of breath. Cristina is a pleasant 69-year-old female with history of above issues I have seen her almost 3 to 4 years back, and last seen by Grupo Wallace CNP 6 months ago. Records have been reviewed and following up with her today. A1c is great at 6.4 similar to the previous visit, she denies any chest pain shortness of breath no syncopal episodes. She does exercise on a regular basis. She does not want to get the covid vaccine, per her [employer]. DM - A1C 6.4% 1/7/21, On Metformin 2000 mg daily. Reports glucose results 120-130s fasting in am.     The recent blood work was on 10/15/2021, glucose was 92 with normal BUN/creatinine. Heart murmur - EF 65%, with mild aortic sclerosis on echo 9/20/19. HTN - on Lisinopril 20 mg daily. ,  She  Is encouraged to take home readings , and update us in 2-3 weeks.      HLD - On Lipitor 10 mg daily. ,  Tolerating statins well and will continue current dose. Results for Mercedes Llamas (MRN [id number]) as of 4/8/2021 09:01   Ref. Range 10/21/2020 08:04   CHOLESTEROL/HDL RELATIVE RISK Latest Ref Range: 4.0 - 4.4  1.8 (L)   Direct-LDL / HDL Risk Latest Ref Range: <3.1  0.6   Cholesterol Latest Ref Range: <200 mg/dL 115   HDL Cholesterol Latest Units: mg/dL 62   LDL Direct Latest Units: mg/dL 43   Triglycerides Latest Ref Range: <150 mg/dL 90   VLDL Latest Ref Range: <39 mg/dL 18     She and her  are doing counseling now, she thinks it is helping and she is feeling better today. Medications    Current Outpatient Medications:     memantine (NAMENDA) 5 MG tablet, Take 1 tablet by mouth 2 times daily, Disp: 60 tablet, Rfl: 3    atorvastatin (LIPITOR) 10 MG tablet, TAKE 1 TABLET BY MOUTH ONE TIME A DAY, Disp: 90 tablet, Rfl: 2    lisinopril (PRINIVIL;ZESTRIL) 20 MG tablet, Take 1 tablet by mouth daily, Disp: 90 tablet, Rfl: 2    metFORMIN (GLUCOPHAGE-XR) 500 MG extended release tablet, Take 4 tablets by mouth daily (with breakfast), Disp: 360 tablet, Rfl: 2    Multiple Vitamins-Minerals (THERAPEUTIC MULTIVITAMIN-MINERALS) tablet, Take 1 tablet by mouth daily, Disp: , Rfl:     aspirin 81 MG tablet, Take 1 tablet by mouth daily, Disp: 90 tablet, Rfl: 3    The patient has No Known Allergies. Past Medical History  Odette Cox  has a past medical history of Hyperlipidemia, Hypertension, and Type II or unspecified type diabetes mellitus without mention of complication, not stated as uncontrolled. Past Surgical History  The patient  has no past surgical history on file. Family History  This patient's family history includes Diabetes in her mother; Heart Disease in her father. Social History  Odette Cox  reports that she has never smoked. She has never used smokeless tobacco. She reports that she does not drink alcohol and does not use drugs.     Health Maintenance:    Health Maintenance   Topic Date Due    Hepatitis C screen  Never done    COVID-19 Vaccine (1) Never done    DTaP/Tdap/Td vaccine (1 - Tdap) Never done    Colon cancer screen colonoscopy  Never done    Breast cancer screen  Never done    Shingles Vaccine (1 of 2) Never done    DEXA (modify frequency per FRAX score)  Never done    Diabetic retinal exam  11/12/2015    Pneumococcal 65+ years Vaccine (1 of 1 - PPSV23) Never done   [employer] Visit (AWV)  Never done    Flu vaccine (1) Never done    Depression Screen  01/07/2022    Diabetic microalbuminuria test  10/15/2022    Lipid screen  10/15/2022    Potassium monitoring  10/15/2022    Creatinine monitoring  10/15/2022    Diabetic foot exam  10/21/2022    A1C test (Diabetic or Prediabetic)  10/21/2022    Hepatitis A vaccine  Aged Out    Hib vaccine  Aged Out    Meningococcal (ACWY) vaccine  Aged Out       Subjective:      Review of Systems   Constitutional: Negative for chills, fatigue and fever. Respiratory: Negative for cough and shortness of breath. Cardiovascular: Negative for chest pain and leg swelling. Gastrointestinal: Negative for abdominal pain, constipation, diarrhea, nausea and vomiting. Endocrine: Negative for polydipsia and polyphagia. Genitourinary: Negative for difficulty urinating and dysuria. Musculoskeletal: Negative for arthralgias and myalgias. Skin: Negative for rash and wound. Neurological: Negative for numbness. Objective:     BP (!) 178/80 (Site: Right Lower Arm, Position: Sitting, Cuff Size: Medium Adult)   Pulse 92   Temp 98.2 °F (36.8 °C)   Ht 5' 4\" (1.626 m)   Wt 133 lb 3.2 oz (60.4 kg)   BMI 22.86 kg/m²     Physical Exam  Vitals reviewed. Constitutional:       General: She is not in acute distress. Appearance: She is well-developed. She is not diaphoretic. HENT:      Head: Normocephalic and atraumatic. Neck:      Thyroid: No thyromegaly.    Cardiovascular:      Rate and Rhythm: Normal rate and regular rhythm. Heart sounds: Normal heart sounds. No murmur heard. No friction rub. No gallop. Pulmonary:      Effort: Pulmonary effort is normal. No respiratory distress. Breath sounds: Normal breath sounds. No wheezing or rales. Abdominal:      General: There is no distension. Palpations: Abdomen is soft. Tenderness: There is no abdominal tenderness. Musculoskeletal:         General: No tenderness or deformity. Skin:     General: Skin is warm and dry. Coloration: Skin is not pale. Findings: No erythema. Neurological:      Mental Status: She is alert and oriented to person, place, and time. Cranial Nerves: No cranial nerve deficit. Labs Reviewed 1/4/2022:    Lab Results   Component Value Date    WBC 7.5 10/18/2018    HGB 12.8 10/18/2018    HCT 39.2 10/18/2018     10/18/2018    CHOL 126 10/15/2021    TRIG 72 10/15/2021    HDL 71 10/15/2021    LDLDIRECT 46 10/15/2021    ALT 12 10/21/2020    AST 15 10/21/2020     10/15/2021    K 3.8 10/15/2021     10/15/2021    CREATININE 0.78 10/15/2021    BUN 19 10/15/2021    CO2 27 10/15/2021    TSH 2.810 10/18/2018    GLUF 137 (H) 03/05/2020    LABA1C 6.4 (H) 10/21/2021    LABMICR 24.3 10/15/2021       DIABETIC FOOT EXAM    Done on the last visit      Assessment/Plan         Diagnosis Orders   1. Alzheimer's disease, unspecified     2. Primary hypertension     3. Controlled type 2 diabetes mellitus with other neurologic complication, unspecified whether long term insulin use ([facility] [address]         1. Controlled type 2 diabetes mellitus without complication, without long-term current use of insulin (HCC)  Well-controlled, on Metformin  Monitor daily fasting glucose, call if rising towards 150 and will adjust medication. Due for fasting labs, collect prior to next appt in 6 months.   - POCT glycosylated hemoglobin (Hb A1C)  - Basic Metabolic Panel;  Future  - Microalbumin / Creatinine Urine Ratio; Future    2. Likely with mild alzheimers dementia - noted recent labs, MMSE was done today its 20 , will initiate her low dose namenda, and obtain labs CBC with B 12, and folate levels, and CT head w/o contrast, and carotid dopplers due to mild   Bruit noted on today's carotid exam.    2. Essential hypertension  Pressure stable on the current regimen  Last EKG noted from 4/21    3. Mixed hyperlipidemia  Continue with lipitor 10 mg daily .    - Lipid, Fasting; Future    4. Persistent albuminuria  On ACEi. Recheck urine in 6 months. - Microalbumin / Creatinine Urine Ratio; was normal on this visit    Reevaluate in about 4-6 weeks. Had a long d/w pt and family today regarding my recommendations and findings. , along with the upcoming work up. Lives home with spouse. Patient given educational materials - see patient instructions. Discussed use, benefit, and side effects of prescribed medications. All patient questions answered. Pt voiced understanding. I spoke with her regarding screening colon and mammogram, she never got one so far. And also spoke with her regarding influenza and covid vaccine.       Electronically signed by Lenn Dancer, MD on 1/4/2022 at 9:40 AM

## 2022-01-10 ENCOUNTER — TELEPHONE (OUTPATIENT)
Dept: INTERNAL MEDICINE CLINIC | Age: 67
End: 2022-01-10

## 2022-01-10 NOTE — TELEPHONE ENCOUNTER
----- Message from Kristina Osei MD sent at 1/6/2022  2:05 PM EST -----  Mild blockage left carotid, we can repeat an u/s later on in about 1 yr.

## 2022-02-15 ENCOUNTER — OFFICE VISIT (OUTPATIENT)
Dept: INTERNAL MEDICINE CLINIC | Age: 67
End: 2022-02-15
Payer: MEDICARE

## 2022-02-15 VITALS
WEIGHT: 134 LBS | HEART RATE: 74 BPM | BODY MASS INDEX: 22.88 KG/M2 | HEIGHT: 64 IN | TEMPERATURE: 98 F | DIASTOLIC BLOOD PRESSURE: 80 MMHG | SYSTOLIC BLOOD PRESSURE: 170 MMHG

## 2022-02-15 DIAGNOSIS — E11.9 CONTROLLED TYPE 2 DIABETES MELLITUS WITHOUT COMPLICATION, WITHOUT LONG-TERM CURRENT USE OF INSULIN (HCC): Primary | ICD-10-CM

## 2022-02-15 DIAGNOSIS — I10 PRIMARY HYPERTENSION: ICD-10-CM

## 2022-02-15 LAB — HBA1C MFR BLD: 8.4 % (ref 4.3–5.7)

## 2022-02-15 PROCEDURE — 99213 OFFICE O/P EST LOW 20 MIN: CPT | Performed by: INTERNAL MEDICINE

## 2022-02-15 PROCEDURE — G8420 CALC BMI NORM PARAMETERS: HCPCS | Performed by: INTERNAL MEDICINE

## 2022-02-15 PROCEDURE — 1090F PRES/ABSN URINE INCON ASSESS: CPT | Performed by: INTERNAL MEDICINE

## 2022-02-15 PROCEDURE — 4040F PNEUMOC VAC/ADMIN/RCVD: CPT | Performed by: INTERNAL MEDICINE

## 2022-02-15 PROCEDURE — 3052F HG A1C>EQUAL 8.0%<EQUAL 9.0%: CPT | Performed by: INTERNAL MEDICINE

## 2022-02-15 PROCEDURE — 83036 HEMOGLOBIN GLYCOSYLATED A1C: CPT | Performed by: INTERNAL MEDICINE

## 2022-02-15 PROCEDURE — G8427 DOCREV CUR MEDS BY ELIG CLIN: HCPCS | Performed by: INTERNAL MEDICINE

## 2022-02-15 PROCEDURE — 3017F COLORECTAL CA SCREEN DOC REV: CPT | Performed by: INTERNAL MEDICINE

## 2022-02-15 PROCEDURE — G8484 FLU IMMUNIZE NO ADMIN: HCPCS | Performed by: INTERNAL MEDICINE

## 2022-02-15 PROCEDURE — G8400 PT W/DXA NO RESULTS DOC: HCPCS | Performed by: INTERNAL MEDICINE

## 2022-02-15 PROCEDURE — 1036F TOBACCO NON-USER: CPT | Performed by: INTERNAL MEDICINE

## 2022-02-15 PROCEDURE — 2022F DILAT RTA XM EVC RTNOPTHY: CPT | Performed by: INTERNAL MEDICINE

## 2022-02-15 PROCEDURE — 1123F ACP DISCUSS/DSCN MKR DOCD: CPT | Performed by: INTERNAL MEDICINE

## 2022-02-15 RX ORDER — EMPAGLIFLOZIN 10 MG/1
10 TABLET, FILM COATED ORAL DAILY
Qty: 30 TABLET | Refills: 5 | Status: SHIPPED | OUTPATIENT
Start: 2022-02-15 | End: 2022-03-07

## 2022-02-15 RX ORDER — VALSARTAN 160 MG/1
160 TABLET ORAL DAILY
Qty: 30 TABLET | Refills: 5 | Status: SHIPPED | OUTPATIENT
Start: 2022-02-15 | End: 2022-06-30 | Stop reason: SDUPTHER

## 2022-02-15 SDOH — ECONOMIC STABILITY: FOOD INSECURITY: WITHIN THE PAST 12 MONTHS, THE FOOD YOU BOUGHT JUST DIDN'T LAST AND YOU DIDN'T HAVE MONEY TO GET MORE.: NEVER TRUE

## 2022-02-15 SDOH — ECONOMIC STABILITY: FOOD INSECURITY: WITHIN THE PAST 12 MONTHS, YOU WORRIED THAT YOUR FOOD WOULD RUN OUT BEFORE YOU GOT MONEY TO BUY MORE.: NEVER TRUE

## 2022-02-15 ASSESSMENT — ENCOUNTER SYMPTOMS
VOMITING: 0
CONSTIPATION: 0
NAUSEA: 0
SHORTNESS OF BREATH: 0
ABDOMINAL PAIN: 0
DIARRHEA: 0
COUGH: 0

## 2022-02-15 ASSESSMENT — PATIENT HEALTH QUESTIONNAIRE - PHQ9
SUM OF ALL RESPONSES TO PHQ QUESTIONS 1-9: 0
2. FEELING DOWN, DEPRESSED OR HOPELESS: 0
SUM OF ALL RESPONSES TO PHQ QUESTIONS 1-9: 0
SUM OF ALL RESPONSES TO PHQ QUESTIONS 1-9: 0
1. LITTLE INTEREST OR PLEASURE IN DOING THINGS: 0
SUM OF ALL RESPONSES TO PHQ QUESTIONS 1-9: 0
SUM OF ALL RESPONSES TO PHQ9 QUESTIONS 1 & 2: 0

## 2022-02-15 ASSESSMENT — SOCIAL DETERMINANTS OF HEALTH (SDOH): HOW HARD IS IT FOR YOU TO PAY FOR THE VERY BASICS LIKE FOOD, HOUSING, MEDICAL CARE, AND HEATING?: NOT HARD AT ALL

## 2022-02-15 NOTE — PROGRESS NOTES
[facility]  [address] [facility] [address]  Dept: [phone]  Dept Fax: [phone]: [phone]     Visit Date:  2/15/2022    Patient:  Gerard Rey  YOB: 1955    HPI:     Chief Complaint   Patient presents with    Diabetes     6 week follow up    Hypertension    Hyperlipidemia       She is here today , with family due to above. She has been a diabetic for some time, mother of 3 kids. Admits to forgetting things getting worse, no CP or SOB. Here with spouse, and daughter, and grand daughter. She is a non smoker, no fever or chills, no recent visual disturbances, no HA . She does not Believe in flu or covid vaccines. Today we have done a mini mental ,  total score of 20 . Dawna Valero is a pleasant 26-year-old female with history of above issues I have seen her almost 3 to 4 years back,   Records have been reviewed and following up with her today. A1c is great at 6.4 similar to the previous visit, she denies any chest pain shortness of breath no syncopal episodes. She does exercise on a regular basis. She does not want to get the covid vaccine, per her [employer]. DM -  Previous A1C 6.4- 6.6 % , On Metformin 2000 mg daily. Reports glucose results 120-130s fasting in am.   Unfortunately today's is up to 8.4, A1c ., admits to being relaxed lately with the diet, claims she is enjoying her life. The recent blood work was on 10/15/2021, glucose was 92 with normal BUN/creatinine. Strong dietary changes and started her on jardiance ,  Get a BMP in about 3 weeks. Heart murmur - EF 65%, with mild aortic sclerosis on echo 9/20/19. HTN - on Lisinopril 20 mg daily. ,claims per family she is taking the meds  Change ACE to ARB , diovan 160 mg daily , and home monitoring was   Recommended. HLD - On Lipitor 10 mg daily. ,  Tolerating statins well and will continue current dose.  Last lipid panel from 10/21 noted, LDL is 46 , results d/w pt    Results for Nadine Sotomayor (MRN [id number]) as of 4/8/2021 09:01    She and her  are doing counseling now, she thinks it is helping and she is feeling better today. Medications    Current Outpatient Medications:     empagliflozin (JARDIANCE) 10 MG tablet, Take 1 tablet by mouth daily, Disp: 30 tablet, Rfl: 5    valsartan (DIOVAN) 160 MG tablet, Take 1 tablet by mouth daily Stopped the lisinopril, Disp: 30 tablet, Rfl: 5    memantine (NAMENDA) 5 MG tablet, Take 1 tablet by mouth 2 times daily, Disp: 60 tablet, Rfl: 3    atorvastatin (LIPITOR) 10 MG tablet, TAKE 1 TABLET BY MOUTH ONE TIME A DAY, Disp: 90 tablet, Rfl: 2    metFORMIN (GLUCOPHAGE-XR) 500 MG extended release tablet, Take 4 tablets by mouth daily (with breakfast), Disp: 360 tablet, Rfl: 2    Multiple Vitamins-Minerals (THERAPEUTIC MULTIVITAMIN-MINERALS) tablet, Take 1 tablet by mouth daily, Disp: , Rfl:     aspirin 81 MG tablet, Take 1 tablet by mouth daily, Disp: 90 tablet, Rfl: 3    The patient has No Known Allergies. Past Medical History  Lianne Richmond  has a past medical history of Hyperlipidemia, Hypertension, and Type II or unspecified type diabetes mellitus without mention of complication, not stated as uncontrolled. Past Surgical History  The patient  has no past surgical history on file. Family History  This patient's family history includes Diabetes in her mother; Heart Disease in her father. Social History  Lianne Richmond  reports that she has never smoked. She has never used smokeless tobacco. She reports that she does not drink alcohol and does not use drugs.     Health Maintenance:    Health Maintenance   Topic Date Due    Hepatitis C screen  Never done    COVID-19 Vaccine (1) Never done    Depression Screen  Never done    DTaP/Tdap/Td vaccine (1 - Tdap) Never done    Colon cancer screen colonoscopy  Never done    Breast cancer screen  Never done    Shingles Vaccine (1 of 2) Never done    DEXA (modify frequency per FRAX score)  Never done    Diabetic retinal exam  11/12/2015    Pneumococcal 65+ years Vaccine (1 of 1 - PPSV23) Never done   [employer] Visit (AWV)  Never done    Flu vaccine (1) Never done    Diabetic microalbuminuria test  10/15/2022    Lipid screen  10/15/2022    Potassium monitoring  10/15/2022    Creatinine monitoring  10/15/2022    Diabetic foot exam  10/21/2022    A1C test (Diabetic or Prediabetic)  02/15/2023    Hepatitis A vaccine  Aged Out    Hib vaccine  Aged Out    Meningococcal (ACWY) vaccine  Aged Out       Subjective:      Review of Systems   Constitutional: Negative for chills, fatigue and fever. Respiratory: Negative for cough and shortness of breath. Cardiovascular: Negative for chest pain and leg swelling. Gastrointestinal: Negative for abdominal pain, constipation, diarrhea, nausea and vomiting. Endocrine: Negative for polydipsia and polyphagia. Genitourinary: Negative for difficulty urinating and dysuria. Musculoskeletal: Negative for arthralgias and myalgias. Skin: Negative for rash and wound. Neurological: Negative for numbness. Objective:     BP (!) 170/80 (Site: Left Upper Arm)   Pulse 74   Temp 98 °F (36.7 °C)   Ht 5' 4.02\" (1.626 m)   Wt 134 lb (60.8 kg)   BMI 22.99 kg/m²     Physical Exam  Vitals reviewed. Constitutional:       General: She is not in acute distress. Appearance: She is well-developed. She is not diaphoretic. HENT:      Head: Normocephalic and atraumatic. Neck:      Thyroid: No thyromegaly. Cardiovascular:      Rate and Rhythm: Normal rate and regular rhythm. Heart sounds: Normal heart sounds. No murmur heard. No friction rub. No gallop. Pulmonary:      Effort: Pulmonary effort is normal. No respiratory distress. Breath sounds: Normal breath sounds. No wheezing or rales. Abdominal:      General: There is no distension. Palpations: Abdomen is soft. Tenderness: There is no abdominal tenderness. Musculoskeletal:         General: No tenderness or deformity. Skin:     General: Skin is warm and dry. Coloration: Skin is not pale. Findings: No erythema. Neurological:      Mental Status: She is alert and oriented to person, place, and time. Cranial Nerves: No cranial nerve deficit. Labs Reviewed 2/15/2022:    Lab Results   Component Value Date    WBC 7.5 10/18/2018    HGB 12.8 10/18/2018    HCT 39.2 10/18/2018     10/18/2018    CHOL 126 10/15/2021    TRIG 72 10/15/2021    HDL 71 10/15/2021    LDLDIRECT 46 10/15/2021    ALT 12 10/21/2020    AST 15 10/21/2020     10/15/2021    K 3.8 10/15/2021     10/15/2021    CREATININE 0.78 10/15/2021    BUN 19 10/15/2021    CO2 27 10/15/2021    TSH 2.810 10/18/2018    GLUF 137 (H) 03/05/2020    LABA1C 8.4 (H) 02/15/2022    LABMICR 24.3 10/15/2021       DIABETIC FOOT EXAM    Not due at this time       Assessment/Plan         Diagnosis Orders   1. Controlled type 2 diabetes mellitus without complication, without long-term current use of insulin (HCC)  POCT glycosylated hemoglobin (Hb A1C)    Basic Metabolic Panel   2. Primary hypertension         1. Controlled type 2 diabetes mellitus without complication, without long-term current use of insulin (HCC)  Well-controlled, on Metformin  Monitor daily fasting glucose, call if rising towards 150 and will adjust medication. Due for fasting labs, collect prior to next appt in 6 months.   - POCT glycosylated hemoglobin (Hb A1C)  - Basic Metabolic Panel; Future  - Microalbumin / Creatinine Urine Ratio; Future    2.  Likely with mild alzheimers dementia - noted recent labs, MMSE was done today its 20 , will initiate her low dose namenda, and obtain labs CBC with B 12, and folate levels, and CT head w/o contrast, and carotid dopplers due to mild   Bruit noted on today's carotid exam.    2. Essential hypertension  Due to higher readings at home and in the office, changed ACE to ARB today   Last EKG noted from 4/21    3. Mixed hyperlipidemia  Continue with lipitor 10 mg daily .    - Lipid, Fasting; Future    4. Persistent albuminuria  On ACEi. Recheck urine in 6 months. - Microalbumin / Creatinine Urine Ratio; was normal on this visit    Reevaluate in about 4-6 weeks. Had a long d/w pt and family today regarding my recommendations and findings. , along with the upcoming work up. Lives home with spouse. Patient given educational materials - see patient instructions. Discussed use, benefit, and side effects of prescribed medications. All patient questions answered. Pt voiced understanding. I spoke with her regarding screening colon and mammogram, she never got one so far. And also spoke with her regarding influenza and covid vaccine.         Electronically signed by Ashley Velarde MD on 2/15/2022 at 10:24 AM

## 2022-02-18 ENCOUNTER — TELEPHONE (OUTPATIENT)
Dept: INTERNAL MEDICINE CLINIC | Age: 67
End: 2022-02-18

## 2022-02-18 NOTE — TELEPHONE ENCOUNTER
Pt. Called yesterday, but it was her  on the line. Asking what to do about the Jardiance because it is making her fatigued, dizzy, and causing her to fall. They claim it is the Jardiance because these symptoms happened right after starting Jardiance. Asked if they could check her blood sugar. The Pt. And  said they do not and that she just fell [place] some [place] at [place] [employer]. The pt. Caught herself, but pt. still was feeling dizzy. I told them to go to the ER as soon as possible. Pt. And their  stated understanding.

## 2022-02-22 NOTE — TELEPHONE ENCOUNTER
Yes they can hold the jardiance, and give us an update in 10 -14 days. Need to follow up with blood sugars, and recommend increase hydration with water.

## 2022-02-22 NOTE — TELEPHONE ENCOUNTER
Called pt. And explained everything  responded with. Pt. Stated understanding and will be seen on 3/7/2022 for .

## 2022-03-01 LAB
ANION GAP SERPL CALCULATED.3IONS-SCNC: 11 MMOL/L (ref 4–12)
BUN BLDV-MCNC: 17 MG/DL (ref 7–20)
CALCIUM SERPL-MCNC: 9.9 MG/DL (ref 8.8–10.5)
CHLORIDE BLD-SCNC: 103 MEQ/L (ref 101–111)
CO2: 25 MEQ/L (ref 21–32)
CREAT SERPL-MCNC: 0.95 MG/DL (ref 0.6–1.3)
CREATININE CLEARANCE: 58
GLUCOSE: 103 MG/DL (ref 70–110)
POTASSIUM SERPL-SCNC: 4.1 MEQ/L (ref 3.6–5)
SODIUM BLD-SCNC: 139 MEQ/L (ref 135–145)

## 2022-03-02 RX ORDER — EMPAGLIFLOZIN 10 MG/1
1 TABLET, FILM COATED ORAL DAILY
Qty: 28 TABLET | Refills: 0 | COMMUNITY
Start: 2022-03-02 | End: 2022-03-07

## 2022-03-07 ENCOUNTER — OFFICE VISIT (OUTPATIENT)
Dept: INTERNAL MEDICINE CLINIC | Age: 67
End: 2022-03-07
Payer: MEDICARE

## 2022-03-07 VITALS
HEART RATE: 100 BPM | WEIGHT: 129.3 LBS | SYSTOLIC BLOOD PRESSURE: 108 MMHG | TEMPERATURE: 97.3 F | DIASTOLIC BLOOD PRESSURE: 64 MMHG | BODY MASS INDEX: 22.18 KG/M2

## 2022-03-07 DIAGNOSIS — I10 PRIMARY HYPERTENSION: ICD-10-CM

## 2022-03-07 DIAGNOSIS — E11.65 TYPE 2 DIABETES MELLITUS WITH HYPERGLYCEMIA, WITHOUT LONG-TERM CURRENT USE OF INSULIN (HCC): ICD-10-CM

## 2022-03-07 DIAGNOSIS — G30.9 ALZHEIMER'S DISEASE, UNSPECIFIED (CODE) (HCC): Primary | ICD-10-CM

## 2022-03-07 PROCEDURE — G8484 FLU IMMUNIZE NO ADMIN: HCPCS | Performed by: INTERNAL MEDICINE

## 2022-03-07 PROCEDURE — 99213 OFFICE O/P EST LOW 20 MIN: CPT | Performed by: INTERNAL MEDICINE

## 2022-03-07 PROCEDURE — G8427 DOCREV CUR MEDS BY ELIG CLIN: HCPCS | Performed by: INTERNAL MEDICINE

## 2022-03-07 PROCEDURE — 3017F COLORECTAL CA SCREEN DOC REV: CPT | Performed by: INTERNAL MEDICINE

## 2022-03-07 PROCEDURE — 1090F PRES/ABSN URINE INCON ASSESS: CPT | Performed by: INTERNAL MEDICINE

## 2022-03-07 PROCEDURE — G8400 PT W/DXA NO RESULTS DOC: HCPCS | Performed by: INTERNAL MEDICINE

## 2022-03-07 PROCEDURE — 1036F TOBACCO NON-USER: CPT | Performed by: INTERNAL MEDICINE

## 2022-03-07 PROCEDURE — 2022F DILAT RTA XM EVC RTNOPTHY: CPT | Performed by: INTERNAL MEDICINE

## 2022-03-07 PROCEDURE — 3052F HG A1C>EQUAL 8.0%<EQUAL 9.0%: CPT | Performed by: INTERNAL MEDICINE

## 2022-03-07 PROCEDURE — G8420 CALC BMI NORM PARAMETERS: HCPCS | Performed by: INTERNAL MEDICINE

## 2022-03-07 PROCEDURE — 4040F PNEUMOC VAC/ADMIN/RCVD: CPT | Performed by: INTERNAL MEDICINE

## 2022-03-07 PROCEDURE — 1123F ACP DISCUSS/DSCN MKR DOCD: CPT | Performed by: INTERNAL MEDICINE

## 2022-03-07 ASSESSMENT — ENCOUNTER SYMPTOMS
NAUSEA: 0
DIARRHEA: 0
VOMITING: 0
SHORTNESS OF BREATH: 0
CONSTIPATION: 0
ABDOMINAL PAIN: 0
COUGH: 0

## 2022-03-07 NOTE — PROGRESS NOTES
[facility]  [address] [facility] [address]  Dept: [phone]  Dept Fax: [phone] : [phone]     Visit Date:  3/7/2022    Patient:  Julio Cesar Alejo  YOB: 1955    HPI:     Chief Complaint   Patient presents with    Diabetes     A1C 2/15/22    Hyperlipidemia    Hypertension       She is here today , with family due to above. She has been a diabetic for some time, mother of 3 kids. Admits to forgetting things getting worse, no CP or SOB. Here  daughter,  She is a non smoker, no fever or chills, no recent visual disturbances, no HA . She does not Believe in flu or covid vaccines. Last time  we have done a mini mental ,  total score of 20 . Mya Machado is a pleasant 77-year-old female with history of above issues I have seen her almost 3 to 4 years back,   Records have been reviewed and following up with her today. A1c is great at 6.4 similar to the previous visit, she denies any chest pain shortness of breath no syncopal episodes. She does exercise on a regular basis. She does not want to get the covid vaccine, per her [employer]. DM -  Previous A1C 6.4- 6.6 % , On Metformin 2000 mg daily. Down load today in our office was 120, lowest was 93, and highest `143 . Unfortunately last  A1c . upto 8.4 . , admits to being relaxed lately with the diet, claims she is enjoying her life. She could not tolerate jardiance, samples and script given on the last visit. Heart murmur - EF 65%, with mild aortic sclerosis on echo 9/20/19. HTN - on Lisinopril 20 mg daily. ,claims per family she is taking the meds  Change ACE to ARB , diovan 160 mg daily , and home monitoring was   Recommended. HLD - On Lipitor 10 mg daily. ,  Tolerating statins well and will continue current dose.  Last lipid panel from 10/21 noted, LDL is 46 , results d/w pt    Results for Nimesh Mar (MRN [id number]) as of 4/8/2021 09:01    She and her  are doing counseling now, she thinks it is helping and she is feeling better today. Medications    Current Outpatient Medications:     linagliptin (TRADJENTA) 5 MG tablet, Take 1 tablet by mouth daily, Disp: 30 tablet, Rfl: 5    valsartan (DIOVAN) 160 MG tablet, Take 1 tablet by mouth daily Stopped the lisinopril, Disp: 30 tablet, Rfl: 5    memantine (NAMENDA) 5 MG tablet, Take 1 tablet by mouth 2 times daily, Disp: 60 tablet, Rfl: 3    atorvastatin (LIPITOR) 10 MG tablet, TAKE 1 TABLET BY MOUTH ONE TIME A DAY, Disp: 90 tablet, Rfl: 2    metFORMIN (GLUCOPHAGE-XR) 500 MG extended release tablet, Take 4 tablets by mouth daily (with breakfast), Disp: 360 tablet, Rfl: 2    Multiple Vitamins-Minerals (THERAPEUTIC MULTIVITAMIN-MINERALS) tablet, Take 1 tablet by mouth daily, Disp: , Rfl:     aspirin 81 MG tablet, Take 1 tablet by mouth daily, Disp: 90 tablet, Rfl: 3    The patient has No Known Allergies. Past Medical History  Jessenia Arreola  has a past medical history of Hyperlipidemia, Hypertension, and Type II or unspecified type diabetes mellitus without mention of complication, not stated as uncontrolled. Past Surgical History  The patient  has no past surgical history on file. Family History  This patient's family history includes Diabetes in her mother; Heart Disease in her father. Social History  Jessenia Arreola  reports that she has never smoked. She has never used smokeless tobacco. She reports that she does not drink alcohol and does not use drugs.     Health Maintenance:    Health Maintenance   Topic Date Due    Hepatitis C screen  Never done    COVID-19 Vaccine (1) Never done    DTaP/Tdap/Td vaccine (1 - Tdap) Never done    Colorectal Cancer Screen  Never done    Breast cancer screen  Never done    Shingles Vaccine (1 of 2) Never done    DEXA (modify frequency per FRAX score)  Never done    Diabetic retinal exam  11/12/2015    Pneumococcal 65+ years Vaccine (1 of 1 - PPSV23) Never done   [employer] Visit (AWV)  Never done    Flu vaccine (1) Never done    Diabetic microalbuminuria test  10/15/2022    Lipid screen  10/15/2022    Diabetic foot exam  10/21/2022    A1C test (Diabetic or Prediabetic)  02/15/2023    Depression Screen  02/15/2023    Potassium monitoring  03/01/2023    Creatinine monitoring  03/01/2023    Hepatitis A vaccine  Aged Out    Hib vaccine  Aged Out    Meningococcal (ACWY) vaccine  Aged Out       Subjective:      Review of Systems   Constitutional: Negative for chills, fatigue and fever. Respiratory: Negative for cough and shortness of breath. Cardiovascular: Negative for chest pain and leg swelling. Gastrointestinal: Negative for abdominal pain, constipation, diarrhea, nausea and vomiting. Endocrine: Negative for polydipsia and polyphagia. Genitourinary: Negative for difficulty urinating and dysuria. Musculoskeletal: Negative for arthralgias and myalgias. Skin: Negative for rash and wound. Neurological: Negative for numbness. Objective:     /64 (Site: Left Upper Arm)   Pulse 100   Temp 97.3 °F (36.3 °C)   Wt 129 lb 4.8 oz (58.7 kg)   BMI 22.18 kg/m²     Physical Exam  Vitals reviewed. Constitutional:       General: She is not in acute distress. Appearance: She is well-developed. She is not diaphoretic. HENT:      Head: Normocephalic and atraumatic. Neck:      Thyroid: No thyromegaly. Cardiovascular:      Rate and Rhythm: Normal rate and regular rhythm. Heart sounds: Normal heart sounds. No murmur heard. No friction rub. No gallop. Pulmonary:      Effort: Pulmonary effort is normal. No respiratory distress. Breath sounds: Normal breath sounds. No wheezing or rales. Abdominal:      General: There is no distension. Palpations: Abdomen is soft. Tenderness: There is no abdominal tenderness. Musculoskeletal:         General: No tenderness or deformity.    Skin: General: Skin is warm and dry. Coloration: Skin is not pale. Findings: No erythema. Neurological:      Mental Status: She is alert and oriented to person, place, and time. Cranial Nerves: No cranial nerve deficit. Labs Reviewed 3/7/2022:    Lab Results   Component Value Date    WBC 7.5 10/18/2018    HGB 12.8 10/18/2018    HCT 39.2 10/18/2018     10/18/2018    CHOL 126 10/15/2021    TRIG 72 10/15/2021    HDL 71 10/15/2021    LDLDIRECT 46 10/15/2021    ALT 12 10/21/2020    AST 15 10/21/2020     03/01/2022    K 4.1 03/01/2022     03/01/2022    CREATININE 0.95 03/01/2022    BUN 17 03/01/2022    CO2 25 03/01/2022    TSH 2.810 10/18/2018    GLUF 137 (H) 03/05/2020    LABA1C 8.4 (H) 02/15/2022    LABMICR 24.3 10/15/2021       DIABETIC FOOT EXAM    Not due at this time       Assessment/Plan         Diagnosis Orders   1. [facility]'s [facility], unspecified ([facility]) ([facility]  Amb External Referral To Neurology   2. Type 2 diabetes mellitus with hyperglycemia, without long-term current use of insulin (HCC)  Amb External Referral To Neurology   3. Primary hypertension         1. Controlled type 2 diabetes mellitus without complication, without long-term current use of insulin (HCC)  , on Metformin , gave samples of jardiance but can't afford them,   So added jardiance 5 mg daily . Monitor daily fasting glucose, call if rising towards 150 and will adjust medication. Due for fasting labs, collect prior to next appt in 3 months.   - POCT glycosylated hemoglobin (Hb A1C)  - Basic Metabolic Panel; Future  - Microalbumin / Creatinine Urine Ratio; Future    2.  Likely with mild alzheimers dementia - noted recent labs, MMSE was done today its 20 , will initiate her low dose namenda, and obtain labs CBC with B 12, and folate levels, and CT head w/o contrast, and carotid dopplers due to mild   Bruit noted on today's carotid exam. The CT and carotid u/s noted from Jan  And refer to neurology at [facility], per pt's preference for further work up. 2. Essential hypertension  Due to higher readings at home and in the office, changed ACE to ARB today   Last EKG noted from 4/21    3. Mixed hyperlipidemia  Continue with lipitor 10 mg daily .    - Lipid, Fasting; Future    4. Persistent albuminuria  On ACEi. Recheck urine in 6 months. - Microalbumin / Creatinine Urine Ratio; was normal on this visit    Reevaluate in about 3 months . Had a long d/w pt and family today regarding my recommendations and findings. , along with the upcoming work up. Lives home with spouse.               Electronically signed by Jonatan Rivera MD on 3/7/2022 at 2:14 PM

## 2022-03-11 ENCOUNTER — TELEPHONE (OUTPATIENT)
Dept: INTERNAL MEDICINE CLINIC | Age: 67
End: 2022-03-11

## 2022-03-11 NOTE — TELEPHONE ENCOUNTER
I originally called this pt. Yesterday, but it was their . They wanted me to call today so he could find the medication causing her dizziness, fatigue, and urges to fall over. The pt. Yesterday evening was fine, but it was earlier that day where she had these problems. Therefor, I called today and the pt. Explained she believes it is her Jardiance. Pt. Explains that her sugars were normal before the dizzy spells. What are your thoughts.

## 2022-03-11 NOTE — TELEPHONE ENCOUNTER
As we spoke earlier, stop the jardiance. And she needs to monitor the sugars , and BP   And if dizzy spells or LOC occurs needs to seek medical attention ASAP, office or ER.       Electronically signed by Kerry Trejo MD on 3/11/2022 at 12:33 PM

## 2022-04-04 ENCOUNTER — TELEPHONE (OUTPATIENT)
Dept: INTERNAL MEDICINE CLINIC | Age: 67
End: 2022-04-04

## 2022-04-04 NOTE — TELEPHONE ENCOUNTER
Pt. Called. Pt.'s insurance will not cover Javy Horton. What kind of adjustment would you like to make. Possible (Maybe expensive) alternatives: Jardiance, Invokana, Janumet. Cheaper Alternatives: Glucotrol, Glimepiride, Actos.

## 2022-04-11 ENCOUNTER — TELEPHONE (OUTPATIENT)
Dept: INTERNAL MEDICINE CLINIC | Age: 67
End: 2022-04-11

## 2022-04-11 NOTE — TELEPHONE ENCOUNTER
This is a pt. ff  and I's. They called concerned because they have been having diarrhea 3-4 times a week now for the past 2 weeks, going on 3 weeks now. Pt. Is on a dementia medication known as memantine 5 mgs. This medication can cause diarrhea, but pt. Says it was only off and on until now. The Diarrhea has become more frequents and nagging per the pt.'s concerns. With  being out this week, what do you suggest we do concerning this?

## 2022-04-11 NOTE — TELEPHONE ENCOUNTER
I informed the pt. And they were fine with this. They will stop the two medication. They also said they will get the imodium and the metamucil. I told them to call us back in a week or two to let us know how she is doing. However, the pt. Complains of fogginess and fatigue when taking the Valsartan. She said she stopped taking it today. What are your suggestions ?

## 2022-04-18 ENCOUNTER — TELEPHONE (OUTPATIENT)
Dept: INTERNAL MEDICINE CLINIC | Age: 67
End: 2022-04-18

## 2022-05-13 NOTE — TELEPHONE ENCOUNTER
Lets re check A1c soon, as you know we can check every 3 months, in the past well controlled on diet  And I can decide further if any Rx is needed.

## 2022-05-16 DIAGNOSIS — E11.65 TYPE 2 DIABETES MELLITUS WITH HYPERGLYCEMIA, WITHOUT LONG-TERM CURRENT USE OF INSULIN (HCC): Primary | ICD-10-CM

## 2022-06-26 LAB
AVERAGE GLUCOSE: 140 MG/DL
HBA1C MFR BLD: 6.5 % (ref 4.2–5.6)

## 2022-06-30 ENCOUNTER — OFFICE VISIT (OUTPATIENT)
Dept: INTERNAL MEDICINE CLINIC | Age: 67
End: 2022-06-30
Payer: MEDICARE

## 2022-06-30 VITALS
SYSTOLIC BLOOD PRESSURE: 124 MMHG | BODY MASS INDEX: 20.45 KG/M2 | WEIGHT: 119.2 LBS | DIASTOLIC BLOOD PRESSURE: 76 MMHG | TEMPERATURE: 97.6 F | HEART RATE: 84 BPM

## 2022-06-30 DIAGNOSIS — E11.9 CONTROLLED TYPE 2 DIABETES MELLITUS WITHOUT COMPLICATION, WITHOUT LONG-TERM CURRENT USE OF INSULIN (HCC): ICD-10-CM

## 2022-06-30 DIAGNOSIS — E78.5 HYPERLIPIDEMIA, UNSPECIFIED HYPERLIPIDEMIA TYPE: ICD-10-CM

## 2022-06-30 PROCEDURE — G8420 CALC BMI NORM PARAMETERS: HCPCS | Performed by: INTERNAL MEDICINE

## 2022-06-30 PROCEDURE — 1036F TOBACCO NON-USER: CPT | Performed by: INTERNAL MEDICINE

## 2022-06-30 PROCEDURE — 99213 OFFICE O/P EST LOW 20 MIN: CPT | Performed by: INTERNAL MEDICINE

## 2022-06-30 PROCEDURE — G8427 DOCREV CUR MEDS BY ELIG CLIN: HCPCS | Performed by: INTERNAL MEDICINE

## 2022-06-30 PROCEDURE — 3017F COLORECTAL CA SCREEN DOC REV: CPT | Performed by: INTERNAL MEDICINE

## 2022-06-30 PROCEDURE — 3044F HG A1C LEVEL LT 7.0%: CPT | Performed by: INTERNAL MEDICINE

## 2022-06-30 PROCEDURE — 1090F PRES/ABSN URINE INCON ASSESS: CPT | Performed by: INTERNAL MEDICINE

## 2022-06-30 PROCEDURE — 2022F DILAT RTA XM EVC RTNOPTHY: CPT | Performed by: INTERNAL MEDICINE

## 2022-06-30 PROCEDURE — G8400 PT W/DXA NO RESULTS DOC: HCPCS | Performed by: INTERNAL MEDICINE

## 2022-06-30 PROCEDURE — 1123F ACP DISCUSS/DSCN MKR DOCD: CPT | Performed by: INTERNAL MEDICINE

## 2022-06-30 RX ORDER — ATORVASTATIN CALCIUM 10 MG/1
TABLET, FILM COATED ORAL
Qty: 90 TABLET | Refills: 1 | Status: SHIPPED | OUTPATIENT
Start: 2022-06-30

## 2022-06-30 RX ORDER — MEMANTINE HYDROCHLORIDE 10 MG/1
TABLET ORAL
COMMUNITY
Start: 2022-06-15

## 2022-06-30 RX ORDER — VALSARTAN 160 MG/1
160 TABLET ORAL DAILY
Qty: 30 TABLET | Refills: 5 | Status: SHIPPED | OUTPATIENT
Start: 2022-06-30

## 2022-06-30 RX ORDER — METFORMIN HYDROCHLORIDE 500 MG/1
TABLET, EXTENDED RELEASE ORAL
Qty: 360 TABLET | Refills: 1 | Status: SHIPPED | OUTPATIENT
Start: 2022-06-30

## 2022-06-30 ASSESSMENT — ENCOUNTER SYMPTOMS
ABDOMINAL PAIN: 0
CONSTIPATION: 0
COUGH: 0
SHORTNESS OF BREATH: 0
DIARRHEA: 0
NAUSEA: 0
VOMITING: 0

## 2022-06-30 NOTE — PROGRESS NOTES
[facility]  [address]  Dept: [phone]  Dept Fax: [phone]  Loc: [phone]     Visit Date:  6/30/2022    Patient:  Maryjo Mcintyre  YOB: 1955    HPI:     Chief Complaint   Patient presents with    Diabetes     A1C 6/24/22    Hypertension    Other     Alzheimer's Disease       She is here today , with family due to above. She has been a diabetic for some time, mother of 3 kids. Admits to forgetting things getting worse, no CP or SOB. Here  daughter,  She is a non smoker, no fever or chills, no recent visual disturbances, no HA . She does not Believe in flu or covid vaccines. Last time  we have done a mini mental ,  total score of 20 . She is being followed by the local clinic for dementia work up . She checks sugars twice daily . She is up to date on her annual eye exam .       Dorian Sotomayor is a pleasant 59-year-old female with history of above issues I have seen her almost 3 to 4 years back,   Records have been reviewed and following up with her today. A1c is great at 6.5  Vs 8.4  she denies any chest pain shortness of breath no syncopal episodes. She does exercise on a regular basis. She does not want to get the covid vaccine, per her Orthodoxy. DM -  On Metformin 2000 mg daily. We could not down load her machine  Due to technical problems. , last sugar reading 118 . Heart murmur - EF 65%, with mild aortic sclerosis on echo 9/20/19. HTN -  ,claims per family she is taking the meds  Changed ACE to ARB , diovan 160 mg daily on the last visit, tolerating it well and home monitoring was   Recommended. HLD - On Lipitor 10 mg daily. ,  Tolerating statins well and will continue current dose.  Last lipid panel from 10/21 noted, LDL is 46 , results d/w pt    Results for Nader Yost (MRN [id number]) as of 4/8/2021 09:01    She and her  are doing counseling now, she thinks it is helping and she is feeling better today. Medications    Current Outpatient Medications:     memantine (NAMENDA) 10 MG tablet, TAKE ONE TABLET BY MOUTH ONCE EVERY DAY, Disp: , Rfl:     valsartan (DIOVAN) 160 MG tablet, Take 1 tablet by mouth daily Stopped the lisinopril, Disp: 30 tablet, Rfl: 5    atorvastatin (LIPITOR) 10 MG tablet, TAKE 1 TABLET BY MOUTH ONE TIME A DAY, Disp: 90 tablet, Rfl: 1    metFORMIN (GLUCOPHAGE-XR) 500 MG extended release tablet, Take 2 tablets by mouth every morning and 2 tablets in the evening., Disp: 360 tablet, Rfl: 1    linagliptin (TRADJENTA) 5 MG tablet, Take 1 tablet by mouth daily, Disp: 30 tablet, Rfl: 5    Multiple Vitamins-Minerals (THERAPEUTIC MULTIVITAMIN-MINERALS) tablet, Take 1 tablet by mouth daily, Disp: , Rfl:     aspirin 81 MG tablet, Take 1 tablet by mouth daily, Disp: 90 tablet, Rfl: 3    The patient has No Known Allergies. Past Medical History  Sonali Villasenor  has a past medical history of Hyperlipidemia, Hypertension, and Type II or unspecified type diabetes mellitus without mention of complication, not stated as uncontrolled. Past Surgical History  The patient  has no past surgical history on file. Family History  This patient's family history includes Diabetes in her mother; Heart Disease in her father. Social History  Sonali Villasenor  reports that she has never smoked. She has never used smokeless tobacco. She reports that she does not drink alcohol and does not use drugs.     Health Maintenance:    Health Maintenance   Topic Date Due    Annual Wellness Visit (AWV)  Never done    COVID-19 Vaccine (1) Never done    Pneumococcal 65+ years Vaccine (1 - PCV) Never done    Hepatitis C screen  Never done    DTaP/Tdap/Td vaccine (1 - Tdap) Never done    Colorectal Cancer Screen  Never done    Breast cancer screen  Never done    Shingles vaccine (1 of 2) Never done    DEXA (modify frequency per FRAX score)  Never done    Diabetic retinal exam  11/12/2015    Flu vaccine (Season Ended) 09/01/2022    Diabetic microalbuminuria test  10/15/2022    Lipids  10/15/2022    Diabetic foot exam  10/21/2022    Depression Screen  02/15/2023    A1C test (Diabetic or Prediabetic)  06/24/2023    Hepatitis A vaccine  Aged Out    Hib vaccine  Aged Out    Meningococcal (ACWY) vaccine  Aged Out       Subjective:      Review of Systems   Constitutional: Negative for chills, fatigue and fever. Respiratory: Negative for cough and shortness of breath. Cardiovascular: Negative for chest pain and leg swelling. Gastrointestinal: Negative for abdominal pain, constipation, diarrhea, nausea and vomiting. Endocrine: Negative for polydipsia and polyphagia. Genitourinary: Negative for difficulty urinating and dysuria. Musculoskeletal: Negative for arthralgias and myalgias. Skin: Negative for rash and wound. Neurological: Negative for numbness. Objective:     /76 (Site: Right Upper Arm)   Pulse 84   Temp 97.6 °F (36.4 °C)   Wt 119 lb 3.2 oz (54.1 kg)   BMI 20.45 kg/m²     Physical Exam  Vitals reviewed. Constitutional:       General: She is not in acute distress. Appearance: She is well-developed. She is not diaphoretic. HENT:      Head: Normocephalic and atraumatic. Neck:      Thyroid: No thyromegaly. Cardiovascular:      Rate and Rhythm: Normal rate and regular rhythm. Heart sounds: Normal heart sounds. No murmur heard. No friction rub. No gallop. Pulmonary:      Effort: Pulmonary effort is normal. No respiratory distress. Breath sounds: Normal breath sounds. No wheezing or rales. Abdominal:      General: There is no distension. Palpations: Abdomen is soft. Tenderness: There is no abdominal tenderness. Musculoskeletal:         General: No tenderness or deformity. Skin:     General: Skin is warm and dry. Coloration: Skin is not pale. Findings: No erythema.    Neurological:      Mental Status: She is alert and oriented to person, place, and time. Cranial Nerves: No cranial nerve deficit. Labs Reviewed 6/30/2022:    Lab Results   Component Value Date    WBC 7.5 10/18/2018    HGB 12.8 10/18/2018    HCT 39.2 10/18/2018     10/18/2018    CHOL 126 10/15/2021    TRIG 72 10/15/2021    HDL 71 10/15/2021    LDLDIRECT 46 10/15/2021    ALT 12 10/21/2020    AST 15 10/21/2020     03/01/2022    K 4.1 03/01/2022     03/01/2022    CREATININE 0.95 03/01/2022    BUN 17 03/01/2022    CO2 25 03/01/2022    TSH 2.810 10/18/2018    GLUF 137 (H) 03/05/2020    LABA1C 6.5 (H) 06/24/2022    LABMICR 24.3 10/15/2021       DIABETIC FOOT EXAM    Not due at this time       Assessment/Plan         Diagnosis Orders   1. Controlled type 2 diabetes mellitus without complication, without long-term current use of insulin (HCC)  metFORMIN (GLUCOPHAGE-XR) 500 MG extended release tablet    Lipid Panel    Hepatic Function Panel    Hemoglobin A1C   2. Hyperlipidemia, unspecified hyperlipidemia type  atorvastatin (LIPITOR) 10 MG tablet    Lipid Panel    Hepatic Function Panel       1. Controlled type 2 diabetes mellitus without complication, without long-term current use of insulin (HCC)  , on Metformin , gave samples of jardiance but can't afford them,   So added jardiance 5 mg daily . Monitor daily fasting glucose, call if rising towards 150 and will adjust medication. Due for fasting labs, collect prior to next appt in 3 months.   - POCT glycosylated hemoglobin (Hb A1C)  - Basic Metabolic Panel; Future  - Microalbumin / Creatinine Urine Ratio; Future    2.  Likely with mild alzheimers dementia - noted recent labs, MMSE was done today its 20 , will initiate her low dose namenda, and obtain labs CBC with B 12, and folate levels, and CT head w/o contrast, and carotid dopplers due to mild   Bruit noted on today's carotid exam. The CT and carotid u/s noted from Jan  And refer to neurology at [facility], per pt's preference for further work up. 2. Essential hypertension  Previous  higher readings at home and in the office, changed ACE to ARB  and she is tolerating them well, and readings are better, normotensive. Last EKG noted from 4/21    3. Mixed hyperlipidemia  Continue with lipitor 10 mg daily .    - Lipid, Fasting; Future    4. Persistent albuminuria  On ACEi. Recheck urine in 6 months. - Microalbumin / Creatinine Urine Ratio; was normal on this visit    Reevaluate in about 3-4  months . Had a long d/w pt and spouse today regarding my recommendations and findings. , along with the upcoming work up. Lives home with spouse.               Electronically signed by Donn Petersen MD on 6/30/2022 at 9:58 AM

## 2022-08-30 NOTE — TELEPHONE ENCOUNTER
Impression: Age-related nuclear cataract, bilateral: H25.13. Plan: Cataracts not visually significant at this time. Pt ed on visual symptoms, monitor. LPN called patient to let her know instructions. Patient stated she understood.

## 2022-11-01 LAB
ALBUMIN SERPL-MCNC: 4.6 G/DL (ref 3.5–5.2)
ALK PHOSPHATASE: 82 U/L (ref 40–142)
ALT SERPL-CCNC: 10 U/L (ref 5–40)
AST SERPL-CCNC: 16 U/L (ref 9–40)
AVERAGE GLUCOSE: 131 MG/DL
BILIRUB SERPL-MCNC: 0.4 MG/DL
BILIRUBIN DIRECT: <0.2 MG/DL (ref 0–0.3)
CHOLESTEROL/HDL RATIO: 1.8 RATIO
CHOLESTEROL: 114 MG/DL
HBA1C MFR BLD: 6.2 % (ref 4.2–5.6)
HDLC SERPL-MCNC: 63 MG/DL
LDL CHOLESTEROL CALCULATED: 39 MG/DL
LDL/HDL RATIO: 0.6 RATIO
TOTAL PROTEIN: 6.5 G/DL (ref 6.1–8.3)
TRIGL SERPL-MCNC: 60 MG/DL
VLDLC SERPL CALC-MCNC: 12 MG/DL

## 2022-11-07 ENCOUNTER — OFFICE VISIT (OUTPATIENT)
Dept: INTERNAL MEDICINE CLINIC | Age: 67
End: 2022-11-07
Payer: MEDICARE

## 2022-11-07 VITALS — TEMPERATURE: 97.1 F | BODY MASS INDEX: 18.77 KG/M2 | HEART RATE: 78 BPM | WEIGHT: 109.4 LBS

## 2022-11-07 DIAGNOSIS — E78.5 HYPERLIPIDEMIA, UNSPECIFIED HYPERLIPIDEMIA TYPE: ICD-10-CM

## 2022-11-07 DIAGNOSIS — E11.9 CONTROLLED TYPE 2 DIABETES MELLITUS WITHOUT COMPLICATION, WITHOUT LONG-TERM CURRENT USE OF INSULIN (HCC): ICD-10-CM

## 2022-11-07 PROCEDURE — 2022F DILAT RTA XM EVC RTNOPTHY: CPT | Performed by: INTERNAL MEDICINE

## 2022-11-07 PROCEDURE — 1090F PRES/ABSN URINE INCON ASSESS: CPT | Performed by: INTERNAL MEDICINE

## 2022-11-07 PROCEDURE — G8420 CALC BMI NORM PARAMETERS: HCPCS | Performed by: INTERNAL MEDICINE

## 2022-11-07 PROCEDURE — 99214 OFFICE O/P EST MOD 30 MIN: CPT | Performed by: INTERNAL MEDICINE

## 2022-11-07 PROCEDURE — 3044F HG A1C LEVEL LT 7.0%: CPT | Performed by: INTERNAL MEDICINE

## 2022-11-07 PROCEDURE — 3017F COLORECTAL CA SCREEN DOC REV: CPT | Performed by: INTERNAL MEDICINE

## 2022-11-07 PROCEDURE — G8484 FLU IMMUNIZE NO ADMIN: HCPCS | Performed by: INTERNAL MEDICINE

## 2022-11-07 PROCEDURE — 1036F TOBACCO NON-USER: CPT | Performed by: INTERNAL MEDICINE

## 2022-11-07 PROCEDURE — 1123F ACP DISCUSS/DSCN MKR DOCD: CPT | Performed by: INTERNAL MEDICINE

## 2022-11-07 PROCEDURE — G8400 PT W/DXA NO RESULTS DOC: HCPCS | Performed by: INTERNAL MEDICINE

## 2022-11-07 PROCEDURE — G8427 DOCREV CUR MEDS BY ELIG CLIN: HCPCS | Performed by: INTERNAL MEDICINE

## 2022-11-07 RX ORDER — ATORVASTATIN CALCIUM 10 MG/1
TABLET, FILM COATED ORAL
Qty: 90 TABLET | Refills: 1 | Status: SHIPPED | OUTPATIENT
Start: 2022-11-07

## 2022-11-07 RX ORDER — VALSARTAN 160 MG/1
160 TABLET ORAL DAILY
Qty: 30 TABLET | Refills: 5 | Status: SHIPPED | OUTPATIENT
Start: 2022-11-07

## 2022-11-07 RX ORDER — DONEPEZIL HYDROCHLORIDE 10 MG/1
10 TABLET, FILM COATED ORAL NIGHTLY
Qty: 30 TABLET | Refills: 5 | Status: SHIPPED | OUTPATIENT
Start: 2022-11-07

## 2022-11-07 RX ORDER — METFORMIN HYDROCHLORIDE 500 MG/1
TABLET, EXTENDED RELEASE ORAL
Qty: 360 TABLET | Refills: 1 | Status: SHIPPED | OUTPATIENT
Start: 2022-11-07

## 2022-11-07 ASSESSMENT — ENCOUNTER SYMPTOMS
COUGH: 0
SHORTNESS OF BREATH: 0
VOMITING: 0
NAUSEA: 0
ABDOMINAL PAIN: 0
CONSTIPATION: 0
DIARRHEA: 0

## 2022-11-07 NOTE — PROGRESS NOTES
[facility]  [address] [facility] [address]  Dept: [phone]  Dept Fax: [phone]: [phone]     Visit Date:  11/7/2022    Patient:  Brittny Vazquez  YOB: 1955    HPI:     Chief Complaint   Patient presents with    Diabetes    Hypertension    Hyperlipidemia       She is here today , with spouse due to above. She has been a diabetic for some time, mother of 3 kids. Admits to forgetting things getting worse, no CP or SOB. Here  daughter,  She is a non smoker, no fever or chills, no recent visual disturbances, no HA . She does not Believe in flu or covid vaccines. Last time  we have done a mini mental ,  total score of 20 . She claims ? Dementia is stable. She is being followed by the local clinic for dementia work up . She checks sugars twice daily . She plans on getting an eye exam  Jan 2023.  , up todate on yearly foot exam. Pt denies frequent MVA, no recent hospitalizations. Kiah Vega is a pleasant 66-year-old female with history of above issues I have seen her almost 3 to 4 years back,   Records have been reviewed and following up with her today. A1c is great at 6.5  Vs 8.4  she denies any chest pain shortness of breath no syncopal episodes. She does exercise on a regular basis. She does not want to get the covid vaccine, per her Mu-ism. DM -  On Metformin 2000 mg daily. Average glucose is 100,   Range from 71 to 143. Stopped tradjenta due or lower sugars   At times, and with mild dementia. Heart murmur - EF 65%, with mild aortic sclerosis on echo 9/20/19. HTN -  ,claims per family she is taking the meds  Changed ACE to ARB , diovan 160 mg daily on the last visit, tolerating it well and home monitoring was   Recommended. HLD - On Lipitor 10 mg daily. ,  Tolerating statins well and will continue current dose.  Last lipid panel from 10/21 noted, LDL is 46 , results d/w pt    Results for Lupillo Nava (MRN [id number]) as of 4/8/2021 09:01    She and her  are doing counseling now, she thinks it is helping and she is feeling better today. Medications    Current Outpatient Medications:     valsartan (DIOVAN) 160 MG tablet, Take 1 tablet by mouth daily Stopped the lisinopril, Disp: 30 tablet, Rfl: 5    atorvastatin (LIPITOR) 10 MG tablet, TAKE 1 TABLET BY MOUTH ONE TIME A DAY, Disp: 90 tablet, Rfl: 1    metFORMIN (GLUCOPHAGE-XR) 500 MG extended release tablet, Take 2 tablets by mouth every morning and 2 tablets in the evening., Disp: 360 tablet, Rfl: 1    donepezil (ARICEPT) 10 MG tablet, Take 1 tablet by mouth nightly, Disp: 30 tablet, Rfl: 5    memantine (NAMENDA) 10 MG tablet, TAKE ONE TABLET BY MOUTH ONCE EVERY DAY, Disp: , Rfl:     Multiple Vitamins-Minerals (THERAPEUTIC MULTIVITAMIN-MINERALS) tablet, Take 1 tablet by mouth daily, Disp: , Rfl:     aspirin 81 MG tablet, Take 1 tablet by mouth daily, Disp: 90 tablet, Rfl: 3    The patient has No Known Allergies. Past Medical History  Irene Mejias  has a past medical history of Hyperlipidemia, Hypertension, and Type II or unspecified type diabetes mellitus without mention of complication, not stated as uncontrolled. Past Surgical History  The patient  has no past surgical history on file. Family History  This patient's family history includes Diabetes in her mother; Heart Disease in her father. Social History  Irene Mejias  reports that she has never smoked. She has never used smokeless tobacco. She reports that she does not drink alcohol and does not use drugs.     Health Maintenance:    Health Maintenance   Topic Date Due    COVID-19 Vaccine (1) Never done    Pneumococcal 65+ years Vaccine (1 - PCV) Never done    Hepatitis C screen  Never done    DTaP/Tdap/Td vaccine (1 - Tdap) Never done    Colorectal Cancer Screen  Never done    Breast cancer screen  Never done    Shingles vaccine (1 of 2) Never done    DEXA (modify frequency per FRAX score)  Never done    Diabetic retinal exam  11/12/2015    Annual Wellness Visit (AWV)  Never done    Flu vaccine (1) Never done    Diabetic microalbuminuria test  10/15/2022    Diabetic foot exam  10/21/2022    Depression Screen  02/15/2023    A1C test (Diabetic or Prediabetic)  10/31/2023    Lipids  10/31/2023    Hepatitis A vaccine  Aged Out    Hib vaccine  Aged Out    Meningococcal (ACWY) vaccine  Aged Out       Subjective:      Review of Systems   Constitutional:  Negative for chills, fatigue and fever. Respiratory:  Negative for cough and shortness of breath. Cardiovascular:  Negative for chest pain and leg swelling. Gastrointestinal:  Negative for abdominal pain, constipation, diarrhea, nausea and vomiting. Endocrine: Negative for polydipsia and polyphagia. Genitourinary:  Negative for difficulty urinating and dysuria. Musculoskeletal:  Negative for arthralgias and myalgias. Skin:  Negative for rash and wound. Neurological:  Negative for numbness. Objective:     Pulse 78   Temp 97.1 °F (36.2 °C)   Wt 109 lb 6.4 oz (49.6 kg)   BMI 18.77 kg/m²     Physical Exam  Vitals reviewed. Constitutional:       General: She is not in acute distress. Appearance: She is well-developed. She is not diaphoretic. HENT:      Head: Normocephalic and atraumatic. Neck:      Thyroid: No thyromegaly. Cardiovascular:      Rate and Rhythm: Normal rate and regular rhythm. Heart sounds: Normal heart sounds. No murmur heard. No friction rub. No gallop. Pulmonary:      Effort: Pulmonary effort is normal. No respiratory distress. Breath sounds: Normal breath sounds. No wheezing or rales. Abdominal:      General: There is no distension. Palpations: Abdomen is soft. Tenderness: There is no abdominal tenderness. Musculoskeletal:         General: No tenderness or deformity. Skin:     General: Skin is warm and dry. Coloration: Skin is not pale. Findings: No erythema. Neurological:      Mental Status: She is alert and oriented to person, place, and time. Cranial Nerves: No cranial nerve deficit. Labs Reviewed 11/7/2022:    Lab Results   Component Value Date    WBC 7.5 10/18/2018    HGB 12.8 10/18/2018    HCT 39.2 10/18/2018     10/18/2018    CHOL 114 10/31/2022    TRIG 60 10/31/2022    HDL 63 10/31/2022    LDLDIRECT 46 10/15/2021    ALT 10 10/31/2022    AST 16 10/31/2022     03/01/2022    K 4.1 03/01/2022     03/01/2022    CREATININE 0.95 03/01/2022    BUN 17 03/01/2022    CO2 25 03/01/2022    TSH 2.810 10/18/2018    GLUF 137 (H) 03/05/2020    LABA1C 6.2 (H) 10/31/2022    LABMICR 24.3 10/15/2021       DIABETIC FOOT EXAM    Not due at this time       Assessment/Plan         Diagnosis Orders   1. Controlled type 2 diabetes mellitus without complication, without long-term current use of insulin (HCC)  metFORMIN (GLUCOPHAGE-XR) 500 MG extended release tablet      2. Hyperlipidemia, unspecified hyperlipidemia type  atorvastatin (LIPITOR) 10 MG tablet          1. Controlled type 2 diabetes mellitus without complication, without long-term current use of insulin (HCC)  , on Metformin , will stop the tradjenta due to A1c and lower sugars. Monitor daily fasting glucose, call if rising and will adjust medication.     - POCT glycosylated hemoglobin (Hb A1C)  - Basic Metabolic Panel; Future  - Microalbumin / Creatinine Urine Ratio; Future    2. Likely with mild alzheimers dementia - noted recent labs, MMSE was done today its 20 , will initiate her low dose namenda, and obtain labs CBC with B 12, and folate levels, and CT head w/o contrast, and carotid dopplers due to mild   Bruit noted on today's carotid exam. The CT and carotid u/s noted from Jan, added aricept to the regimen. , in addition to namenda . And referred to neurology at [facility], per pt's preference for further work up. ,  Seeing dr. Gregory Lombardo as dr Rossy Morillo retired.      2. Essential hypertension  Previous  higher readings at home and in the office, changed ACE to ARB  and she is tolerating them well, and readings are better, normotensive. Last EKG noted from 4/21    3. Mixed hyperlipidemia  Continue with lipitor 10 mg daily .    - Lipid, Fasting; Future    4.  albuminuria  On ACEi. , tolerating them well. Recheck urine in 6 months. - Microalbumin / Creatinine Urine Ratio; was normal on this visit    Reevaluate in about  4   months . Had a long d/w pt and spouse today regarding my recommendations and findings. , along with the upcoming work up. Lives home with .                Electronically signed by Skyler Fernandez MD on 11/8/2022 at 5:57 PM

## 2022-11-10 ENCOUNTER — TELEPHONE (OUTPATIENT)
Dept: INTERNAL MEDICINE CLINIC | Age: 67
End: 2022-11-10

## 2022-11-10 NOTE — TELEPHONE ENCOUNTER
Patient called this morning crying, states she is having an adverse affect to the donepezil she started taking Tuesday night. She has taken the med at 9 pm and by 11:30 pm she is up vomiting. This happened both Tuesday night and Wednesday night. She states she is weak and feels if she takes another pill she won't wake up. Patient is reporting she is not vomiting during the day at all and able to keep food down. Blood pressure is 145/77. HR 75. Advised patient to stop taking donepezil until further instructions. Please advise.

## 2022-11-14 NOTE — TELEPHONE ENCOUNTER
Patient notified. She has not been vomiting since she stopped the medication. Patient instructed to keep well hydrated. No

## 2023-01-09 ENCOUNTER — PHARMACY VISIT (OUTPATIENT)
Dept: INTERNAL MEDICINE CLINIC | Age: 68
End: 2023-01-09
Payer: MEDICARE

## 2023-01-09 VITALS
SYSTOLIC BLOOD PRESSURE: 137 MMHG | BODY MASS INDEX: 18.46 KG/M2 | TEMPERATURE: 98.1 F | WEIGHT: 107.6 LBS | HEART RATE: 79 BPM | DIASTOLIC BLOOD PRESSURE: 63 MMHG

## 2023-01-09 DIAGNOSIS — E11.65 TYPE 2 DIABETES MELLITUS WITH HYPERGLYCEMIA, WITHOUT LONG-TERM CURRENT USE OF INSULIN (HCC): Primary | ICD-10-CM

## 2023-01-09 PROCEDURE — G0108 DIAB MANAGE TRN  PER INDIV: HCPCS | Performed by: INTERNAL MEDICINE

## 2023-01-09 NOTE — PROGRESS NOTES
[facility]  [address] [place], [address]  [phone] (phone)  [phone] (fax)    Patient ID: Herberth Pollock 1955  Referring Provider: Dr. Carol Ann Thompson     Diabetes Mellitus Type 2, Initial Visit: Patient here for an initial evaluation of Type 2 diabetes mellitus. Referred for decreasing blood sugars and weight. Patient has been diagnosed with diabetes since ~2013, per patient estimate. Known diabetic complications: none  Cardiovascular risk factors: advanced age (older than 54 for men, 72 for women) and diabetes mellitus  Family history of diabetes: Mom with T2DM  Weight trend: slowly losing weight, lost 2 lbs over the past    Current Diabetes Pharmacotherapy:  Metformin XR 1000mg BID    Glucose Trends:   Current monitoring regimen: Fingerstick blood tests - 2 times daily  Home blood sugar trends:    -Fasting AM: Average 100 ()   -Before dinner: Average: 120 ()  Any episodes of hypoglycemia? no    Lifestyle Factors:   Previous visit with dietician: no  Current diet: B: 1 piece toast w/ agarwal + whole tomato            L: cup of gravy + 2 slices whole wheat toast w/out butter                       D: 3 slices turkey + pretzels                       Snacks: limited, spoonfuls of peanut butter periodically                       Beverages: water, Dt. Orange pop, DtElin Osman, [address] [facility]  [employer]  Current exercise:  walking in the house more, walking outside when warmer    Health Maintenance:  Eye exam current (within one year): no, scheduled next with [facility]   Any history of foot problems? no  Last foot exam: yes Date: today, [facility]  Skin: without breakdown   Ulcers/calluses: none   Nails: normal   Pedal pulses:    Dorsalis pedis, left: present    Dorsalis pedis, right: present    Posterior tibial, left: present    Posterior tibial, right: present  Results of monofilament test: 10/10 bilaterally    Immunizations up to date:    Pneumonia: no   COVID-19: no  Last panel - LDL: Lab Results   Component Value Date    LDLCALC 39 10/31/2022    LDLDIRECT 46 10/15/2021   Taking ASA:  Yes   Taking statin: Yes - atorvastatin  Last microalbumin/creatinine ratio:   Microalbumin Creatinine Ratio   Date Value Ref Range Status   10/15/2021 26.2 0 - 30.0 mg/Gm Final    Taking ACE/ARB: Yes- valsartan  Depression screening completed 2/2022    Vitals:    01/09/23 0857   BP: 137/63   Pulse: 79   Temp: 98.1 °F (36.7 °C)   Weight: 107 lb 9.6 oz (48.8 kg)     Wt Readings from Last 3 Encounters:   01/09/23 107 lb 9.6 oz (48.8 kg)   11/07/22 109 lb 6.4 oz (49.6 kg)   06/30/22 119 lb 3.2 oz (54.1 kg)     Ht Readings from Last 3 Encounters:   02/15/22 5' 4.02\" (1.626 m)   01/04/22 5' 4\" (1.626 m)   04/08/21 5' 4\" (1.626 m)       Focus:   Diabetes Education. Initial A1C: 6.2% (10/31/22). Dannielle Preciado has a history of longstanding diabetes (10+ years) that has been well controlled; she is referred to the DM Clinic for decreasing weight and A1c. SMBG are monitored regularly twice daily; results are all within target range. Of note, Dannielle Preciado is trying to control her diabetes with diet and exercise alone; her personal glucose targets are lower than goal. Dannielle Preciado has cut back significantly on bread and milk to reduce her glucose. She also tries to walk daily. These efforts have led to a decreasing A1c and weight loss. Encouraged Mariann's efforts; however, emphasized the importance of avoiding further weight loss. Chica April to make an effort to increase her carbohydrate intake to ensure adequate nutrition and scheduled follow-up with the dietician. Dannielle Preciado may benefit from a dose decrease in her metformin; however, would like to assess results of diet change first.  Reviewed hypoglycemia management and foot care. Curriculum Area Focus:  Foot care, Glucose checks/goals, Carbohydrate counting/meal planning, and Hypoglycemia management  DSME PLAN:     Blood Sugar Goals- Continue to check your blood sugars twice daily  -Fasting AM:    -Before lunch/dinner:     2. Keep treatment for lows on hand, especially when you are walking    Treatment of hypoglycemia (low blood sugars):   -If sugar is below 80 mg/dL, treat with 15 grams of simple sugar/rapid acting carb (3-4 glucose tablets, 4 oz of regular juice, 1/2 can of pop, 5 sugar-containing candies, 1 tablespoon of honey, 13-15 sweet tarts). -Recheck in 15 minutes. If above 80 mg/dL, have small meal or snack. If not above 80 mg/dL, repeat the 15 grams of simple carbs until above 80 mg/dL. 3. Try to put lotion on the tops and bottoms of your feet twice a week    4. Keep a 2 week food log for the dietician       Goals Addressed                   This Visit's Progress     Maintain/increase current weight: 107 lbs        Try to have at least 1-2 carbohydrate servings per meal                Meter download, medications, PMH and nursing assessment reviewed. Rosemarie Zavala states She is willing to participate in this plan of care and verbalized understanding of all instructions provided. Teach back used to verify comprehension. Follow-up: 2 week with Dietician, 2 month Dr. Pedro Bonilla, 3 month [facility] Clinic RN    Total time involved in direct patient education: 60 minutes.      For Pharmacy Admin Tracking Only    Program: Medical Group  CPA in place:  Yes  Recommendation Provided To: Patient/Caregiver: 1 via In person  Intervention Detail: Lab(s) Ordered  Intervention Accepted By: Patient/Caregiver: 1  Gap Closed?: Yes   Time Spent (min): 8[address] Radha Sosa, PharmD, [facility]  Internal Medicine Clinical Pharmacist  [phone]

## 2023-01-09 NOTE — PATIENT INSTRUCTIONS
Blood Sugar Goals- Continue to check your blood sugars twice daily  -Fasting AM:    -Before lunch/dinner:     2. Keep treatment for lows on hand, especially when you are walking    Treatment of hypoglycemia (low blood sugars):   -If sugar is below 80 mg/dL, treat with 15 grams of simple sugar/rapid acting carb (3-4 glucose tablets, 4 oz of regular juice, 1/2 can of pop, 5 sugar-containing candies, 1 tablespoon of honey, 13-15 sweet tarts). -Recheck in 15 minutes. If above 80 mg/dL, have small meal or snack. If not above 80 mg/dL, repeat the 15 grams of simple carbs until above 80 mg/dL. 3. Try to put lotion on the tops and bottoms of your feet twice a week    4.  Keep a 2 week food log for the dietician

## 2023-01-30 NOTE — PROGRESS NOTES
[address] [place] [address]. [address] [place] [address] John., [facility]lin [facility], [address]  [phone] (phone)  [phone] (fax)    Patient Name: Gregory Cervantes. Date of Birth: [id number]. MRN: [id number]      Assessment: Patient is a 69 y.o. female seen for Initial MNT visit for Type 2 DB.     -Nutritionally relevant labs:   Lab Results   Component Value Date/Time    LABA1C 6.2 (H) 10/31/2022 07:35 AM    LABA1C 6.5 (H) 06/24/2022 07:30 AM    LABA1C 8.4 (H) 02/15/2022 08:16 AM    LABA1C 6.4 (H) 10/21/2021 07:47 AM    LABA1C 6.6 (H) 04/08/2021 07:50 AM    LABA1C 6.3 10/08/2016 06:30 AM    GLUCOSE 103 03/01/2022 01:24 PM    GLUCOSE 92 10/15/2021 01:19 PM    CHOL 114 10/31/2022 07:35 AM    CHOL 194 10/07/2017 12:00 AM    HDL 63 10/31/2022 07:35 AM    LDLCALC 39 10/31/2022 07:35 AM    TRIG 60 10/31/2022 07:35 AM     1/10/23 - Pharm D initial appt  Beto Charles has a history of longstanding diabetes (10+ years) that has been well controlled; she is referred to the [facility] for decreasing weight and A1c. SMBG are monitored regularly twice daily; results are all within target range. Of note, Beto Charles is trying to control her diabetes with diet and exercise alone; her personal glucose targets are lower than goal. Beto Charles has cut back significantly on bread and milk to reduce her glucose. She also tries to walk daily. These efforts have led to a decreasing A1c and weight loss. Edward Waite's efforts; however, emphasized the importance of avoiding further weight loss. Jb Strong to make an effort to increase her carbohydrate intake to ensure adequate nutrition and scheduled follow-up with the dietician.  Beto Charles may benefit from a dose decrease in her metformin; however, would like to assess results of diet change first.  Reviewed hypoglycemia management and foot    Blood Sugar Goals- Continue to check your blood sugars twice daily  -Fasting AM:               -Before lunch/dinner:      2. Keep treatment for lows on hand, especially when you are walking     Treatment of hypoglycemia (low blood sugars):   -If sugar is below 80 mg/dL, treat with 15 grams of simple sugar/rapid acting carb (3-4 glucose tablets, 4 oz of regular juice, 1/2 can of pop, 5 sugar-containing candies, 1 tablespoon of honey, 13-15 sweet tarts). -Recheck in 15 minutes. If above 80 mg/dL, have small meal or snack. If not above 80 mg/dL, repeat the 15 grams of simple carbs until above 80 mg/dL. 3. Try to put lotion on the tops and bottoms of your feet twice a week     4. Keep a 2 week food log for the dietician    Today's Visit:  Pt here with .  -Blood sugar trends: Did not bring meter to appt today.    -Food log:   Current diet: B: 3-4 pancakes, sugar free maple syrup OR Cherries dry a handful OR bread tomato, agarwal and swiss cheese and glass of Orange juice                       L: HB and 3 dill pickles, baked beans, applesauce OR tomato soup and 4 dill pickles, handful of pretzels, madarin oranges OR cheeseburger, dill pickle, fries                       D: BBQ chicken wings, tater wedges and 3 small beets 4 dill pickles, applesauce OR Rising crust Digiorno pizza and applesauce OR 1 cup tomato soup, bread with cheese, beef bologna slice and mandarin oranges. Snacks: limited, spoonfuls of peanut butter periodically                       Beverages: water, Dt. Orange pop, DtElin Holliday, [facility]. [employer]  Avoids bread and milk.    Current exercise:  walking in the house more, walking outside when warmer    -Impression of Dietary Intake: on average, 3  meals per day, usually small portions of food, low carb    Current Outpatient Medications on File Prior to Visit   Medication Sig Dispense Refill    valsartan (DIOVAN) 160 MG tablet Take 1 tablet by mouth daily Stopped the lisinopril 30 tablet 5    atorvastatin (LIPITOR) 10 MG tablet TAKE 1 TABLET BY MOUTH ONE TIME A DAY 90 tablet 1    metFORMIN (GLUCOPHAGE-XR) 500 MG extended release tablet Take 2 tablets by mouth every morning and 2 tablets in the evening. 360 tablet 1    donepezil (ARICEPT) 10 MG tablet Take 1 tablet by mouth nightly 30 tablet 5    memantine (NAMENDA) 10 MG tablet TAKE ONE TABLET BY MOUTH ONCE EVERY DAY      Multiple Vitamins-Minerals (THERAPEUTIC MULTIVITAMIN-MINERALS) tablet Take 1 tablet by mouth daily      aspirin 81 MG tablet Take 1 tablet by mouth daily 90 tablet 3     No current facility-administered medications on file prior to visit. Vitals from current and previous visits:  Ht 5' 4\" (1.626 m)   Wt 107 lb 12.8 oz (48.9 kg)   BMI 18.50 kg/m²     -Body mass index is 18.5 kg/m². 18.5-24.9 - Normal.   -Weight goal: maintain weight. Nutrition Diagnosis:   Food and nutrition-related knowledge deficit related to Lack of previous MNT/currently undergoing MNT as evidenced by Conditions associated with a diagnosis or treatment: Type 2 DB. Intervention:  -Instructed the patient on: Carbohydrate Counting, Consistent Carbohydrate Intake, Food Label Reading, and Meal Planning for Regular, Balanced Meals & Snacks adding extra calories with healthy fats.    -Handouts given for: carbohydrate counting, food logging, healthy snacks, and sample meal plans/menus. Patient Instructions   1.)  Get familiar with reading the nutrition facts label by looking at serving size and total carbohydrates. - Without a label refer to your carb count guide booklet. 2.)  Measure foods for accuracy in carb counting.    3.)  Healthy carb choices:  whole grains, whole wheat pasta, starchy vegetables, fresh fruit and lowfat/non-fat milk and yogurt.     4.)  Your meal plan is found on the inside cover of your carb counting guide booklet:  1st meal or Brkf:  30 gms carbohydrates (=2 carb servings) + 1-2 oz protein  2nd meal or Lunch: 45 gms carbohydrates (=3 carb servings) + 2-3 oz protein + non-starchy veggies  3rd meal or Dinner:  45 gms carbohydrates (=3 carb servings + 2-3 oz protein + non- starchy veggies    Snack time:  15 - 20 gms carbohydrates + 1 oz protein    5.)  Choose lean protein most of the time and Cut in 1/2 added fats to help with weight loss efforts. 6.) Bring a 1-2 week food log and meter to next dietitian appt. Thanks. Check BS:  2x/day  Fasting BS (1st thing in the morning) or before a meal (at least 4 hour since last ate), BS goal:  90 - 130  2 hours after the start of a meal, BS goal:  100 - 150     -Nutrition prescription: 1300 - 1400 calories/day, 135 - 150 g carbs/day. Comprehension verified using teachback method. Monitoring/Evaluation:   -Followup visit: 8 weeks with dietitian.   -Receptiveness to education/goals: Agreeable.  -Evaluation of education: Indicates understanding.  -Readiness to change: contemplation - ambivalent about change aiming for adequate consistent carb intake and adding calories from healthy fats. -Expected compliance: good. Thank you for your referral of this patient. Total time involved in direct patient education: 60 minutes for initial MNT visit.

## 2023-01-31 ENCOUNTER — OFFICE VISIT (OUTPATIENT)
Dept: INTERNAL MEDICINE CLINIC | Age: 68
End: 2023-01-31

## 2023-01-31 VITALS — BODY MASS INDEX: 18.4 KG/M2 | HEIGHT: 64 IN | WEIGHT: 107.8 LBS

## 2023-01-31 DIAGNOSIS — E11.9 CONTROLLED TYPE 2 DIABETES MELLITUS WITHOUT COMPLICATION, WITHOUT LONG-TERM CURRENT USE OF INSULIN (HCC): Primary | ICD-10-CM

## 2023-01-31 NOTE — PATIENT INSTRUCTIONS
1. )  Get familiar with reading the nutrition facts label by looking at serving size and total carbohydrates. - Without a label refer to your carb count guide booklet. 2.)  Measure foods for accuracy in carb counting.    3.)  Healthy carb choices:  whole grains, whole wheat pasta, starchy vegetables, fresh fruit and lowfat/non-fat milk and yogurt. 4.)  Your meal plan is found on the inside cover of your carb counting guide booklet:  1st meal or Brkf:  30 gms carbohydrates (=2 carb servings) + 1-2 oz protein  2nd meal or Lunch: 45 gms carbohydrates (=3 carb servings) + 2-3 oz protein + non-starchy veggies  3rd meal or Dinner:  45 gms carbohydrates (=3 carb servings + 2-3 oz protein + non- starchy veggies    Snack time:  15 - 20 gms carbohydrates + 1 oz protein    5.)  Choose lean protein most of the time and Cut in 1/2 added fats to help with weight loss efforts. 6.) Bring a 1-2 week food log and meter to next dietitian appt. Thanks.   Check BS:  2x/day  Fasting BS (1st thing in the morning) or before a meal (at least 4 hour since last ate), BS goal:  90 - 130  2 hours after the start of a meal, BS goal:  100 - 150

## 2023-03-27 ENCOUNTER — OFFICE VISIT (OUTPATIENT)
Dept: INTERNAL MEDICINE CLINIC | Age: 68
End: 2023-03-27
Payer: MEDICARE

## 2023-03-27 VITALS — BODY MASS INDEX: 18.03 KG/M2 | WEIGHT: 105.6 LBS | HEIGHT: 64 IN

## 2023-03-27 DIAGNOSIS — E11.9 CONTROLLED TYPE 2 DIABETES MELLITUS WITHOUT COMPLICATION, UNSPECIFIED WHETHER LONG TERM INSULIN USE (HCC): Primary | ICD-10-CM

## 2023-03-27 PROCEDURE — 97803 MED NUTRITION INDIV SUBSEQ: CPT | Performed by: DIETITIAN, REGISTERED

## 2023-03-27 NOTE — PATIENT INSTRUCTIONS
Aim for consistent intake of carbohydrates throughout the day. - 30-45 gms carbs/meal + add protein with each meal  - Add non starchy veggies anytime    Check your BS 2 hours after a meal to see if clearing your carbs at the meal.  - Do this 2x/week. - BS goal 2 hours after the start of the meal should for sure be less than 180, Better 150-160    Good job looking at added sugars on the nutrition facts label  - keep under 8 gms of added sugars    Drink 2 cups water 3x/day = 6 cups of water/day. Thanks for the food logging - bring again to next dietitian appt. - Can also bring to your appt with [facility] - nurse educator appt.

## 2023-03-27 NOTE — PROGRESS NOTES
[address] [place] [address]. [address] [place] [address] John., [facility], [address]  [phone] (phone)  [phone] (fax)    Patient Name: Anca Ramos. Date of Birth: [birth year]. MRN: [id number]      Assessment: Patient is a 77 y.o. female seen for follow-up MNT visit for Type 2 DB.     -Nutritionally relevant labs:   Lab Results   Component Value Date/Time    LABA1C 6.2 (H) 10/31/2022 07:35 AM    LABA1C 6.5 (H) 06/24/2022 07:30 AM    LABA1C 8.4 (H) 02/15/2022 08:16 AM    LABA1C 6.4 (H) 10/21/2021 07:47 AM    LABA1C 6.6 (H) 04/08/2021 07:50 AM    LABA1C 6.3 10/08/2016 06:30 AM    GLUCOSE 103 03/01/2022 01:24 PM    GLUCOSE 92 10/15/2021 01:19 PM    CHOL 114 10/31/2022 07:35 AM    CHOL 194 10/07/2017 12:00 AM    HDL 63 10/31/2022 07:35 AM    LDLCALC 39 10/31/2022 07:35 AM    TRIG 60 10/31/2022 07:35 AM     Pt provided written BS and Food logging  -Blood sugar trends: Checking FBS & Dinner  FBS:  89 - 121  Dinner: 100 - 145    -Food recall:   Breakfast: 1/2 banana, rice chex - small portion and does not measure, 1/2 c milk OR 3-4 pancakes, butter, sugar free syrup, diet pepsi to drink  Lunch: Chicken sandwich using 1 sl wheat bread, 7 BBQ chips, mandarin oranges - NSA, diet pepsi OR Fish sandwich, french fries and diet pepsi OR 1 bread, tomato and agarwal and NSA peaches, diet pepsi. Dinner: Chicken and meat only, tomato soup, ice cream 1/2 cup OR Butterfly pork chop, mashed potatoes 1/2 cup and butter beans (5), 1/2 cup ice cream and diet pepsi OR Frozen meal with 3 ribs and a small amount of potatoes and corn. Snacks: Apple with pbutter    -Main Beverages: diet pepsi - drinks 1 bottle throughout the day.   Drinks very little water.    -Impression of Dietary Intake: on average, 3 meals per day, inconsistent carb intake    Current Outpatient Medications on File Prior to Visit   Medication Sig Dispense Refill    valsartan (DIOVAN) 160 MG tablet Take 1 tablet by mouth daily

## 2023-05-11 DIAGNOSIS — E78.5 HYPERLIPIDEMIA, UNSPECIFIED HYPERLIPIDEMIA TYPE: ICD-10-CM

## 2023-05-11 RX ORDER — ATORVASTATIN CALCIUM 10 MG/1
TABLET, FILM COATED ORAL
Qty: 90 TABLET | Refills: 1 | Status: SHIPPED | OUTPATIENT
Start: 2023-05-11

## 2023-05-11 NOTE — TELEPHONE ENCOUNTER
Patient of Dr. Jaci Hines    Refill request received    Last ordered 11/7/22, disp #90, refill 1    Date of last visit 11/7/22  Date of next visit 5/22

## 2023-05-15 DIAGNOSIS — E11.9 CONTROLLED TYPE 2 DIABETES MELLITUS WITHOUT COMPLICATION, WITHOUT LONG-TERM CURRENT USE OF INSULIN (HCC): ICD-10-CM

## 2023-05-15 RX ORDER — VALSARTAN 160 MG/1
160 TABLET ORAL DAILY
Qty: 30 TABLET | Refills: 5 | Status: SHIPPED | OUTPATIENT
Start: 2023-05-15

## 2023-05-15 RX ORDER — METFORMIN HYDROCHLORIDE 500 MG/1
TABLET, EXTENDED RELEASE ORAL
Qty: 360 TABLET | Refills: 1 | Status: SHIPPED | OUTPATIENT
Start: 2023-05-15

## 2023-05-15 RX ORDER — MEMANTINE HYDROCHLORIDE 10 MG/1
TABLET ORAL
Qty: 30 TABLET | Refills: 5 | Status: SHIPPED | OUTPATIENT
Start: 2023-05-15

## 2023-05-15 RX ORDER — METFORMIN HYDROCHLORIDE 500 MG/1
TABLET, EXTENDED RELEASE ORAL
Qty: 360 TABLET | Refills: 1 | Status: CANCELLED | OUTPATIENT
Start: 2023-05-15

## 2023-05-15 NOTE — PROGRESS NOTES
I have just completed the med refills. MVI and ECASA are  OTC meds.       Electronically signed by Juliana Elizabeth MD on 5/15/2023 at 5:30 PM

## 2023-05-22 ENCOUNTER — OFFICE VISIT (OUTPATIENT)
Dept: INTERNAL MEDICINE CLINIC | Age: 68
End: 2023-05-22
Payer: MEDICARE

## 2023-05-22 VITALS
DIASTOLIC BLOOD PRESSURE: 68 MMHG | BODY MASS INDEX: 17.99 KG/M2 | HEART RATE: 76 BPM | SYSTOLIC BLOOD PRESSURE: 138 MMHG | WEIGHT: 104.8 LBS | TEMPERATURE: 98.1 F | RESPIRATION RATE: 18 BRPM | OXYGEN SATURATION: 97 %

## 2023-05-22 DIAGNOSIS — E78.00 PURE HYPERCHOLESTEROLEMIA: ICD-10-CM

## 2023-05-22 DIAGNOSIS — E11.9 CONTROLLED TYPE 2 DIABETES MELLITUS WITHOUT COMPLICATION, UNSPECIFIED WHETHER LONG TERM INSULIN USE (HCC): Primary | ICD-10-CM

## 2023-05-22 DIAGNOSIS — I10 PRIMARY HYPERTENSION: ICD-10-CM

## 2023-05-22 DIAGNOSIS — G30.9 ALZHEIMER'S DISEASE, UNSPECIFIED (CODE) (HCC): ICD-10-CM

## 2023-05-22 LAB — HBA1C MFR BLD: 6.2 % (ref 4.3–5.7)

## 2023-05-22 PROCEDURE — 3044F HG A1C LEVEL LT 7.0%: CPT | Performed by: INTERNAL MEDICINE

## 2023-05-22 PROCEDURE — 3075F SYST BP GE 130 - 139MM HG: CPT | Performed by: INTERNAL MEDICINE

## 2023-05-22 PROCEDURE — 2022F DILAT RTA XM EVC RTNOPTHY: CPT | Performed by: INTERNAL MEDICINE

## 2023-05-22 PROCEDURE — 3078F DIAST BP <80 MM HG: CPT | Performed by: INTERNAL MEDICINE

## 2023-05-22 PROCEDURE — G8418 CALC BMI BLW LOW PARAM F/U: HCPCS | Performed by: INTERNAL MEDICINE

## 2023-05-22 PROCEDURE — 1123F ACP DISCUSS/DSCN MKR DOCD: CPT | Performed by: INTERNAL MEDICINE

## 2023-05-22 PROCEDURE — 83036 HEMOGLOBIN GLYCOSYLATED A1C: CPT | Performed by: INTERNAL MEDICINE

## 2023-05-22 PROCEDURE — 1036F TOBACCO NON-USER: CPT | Performed by: INTERNAL MEDICINE

## 2023-05-22 PROCEDURE — G8400 PT W/DXA NO RESULTS DOC: HCPCS | Performed by: INTERNAL MEDICINE

## 2023-05-22 PROCEDURE — 1090F PRES/ABSN URINE INCON ASSESS: CPT | Performed by: INTERNAL MEDICINE

## 2023-05-22 PROCEDURE — 3017F COLORECTAL CA SCREEN DOC REV: CPT | Performed by: INTERNAL MEDICINE

## 2023-05-22 PROCEDURE — G8427 DOCREV CUR MEDS BY ELIG CLIN: HCPCS | Performed by: INTERNAL MEDICINE

## 2023-05-22 PROCEDURE — 99213 OFFICE O/P EST LOW 20 MIN: CPT | Performed by: INTERNAL MEDICINE

## 2023-05-22 RX ORDER — METFORMIN HYDROCHLORIDE 500 MG/1
TABLET, EXTENDED RELEASE ORAL
Qty: 180 TABLET | Refills: 1 | Status: SHIPPED | OUTPATIENT
Start: 2023-05-22

## 2023-05-22 SDOH — ECONOMIC STABILITY: FOOD INSECURITY: WITHIN THE PAST 12 MONTHS, YOU WORRIED THAT YOUR FOOD WOULD RUN OUT BEFORE YOU GOT MONEY TO BUY MORE.: NEVER TRUE

## 2023-05-22 SDOH — ECONOMIC STABILITY: INCOME INSECURITY: HOW HARD IS IT FOR YOU TO PAY FOR THE VERY BASICS LIKE FOOD, HOUSING, MEDICAL CARE, AND HEATING?: NOT HARD AT ALL

## 2023-05-22 SDOH — ECONOMIC STABILITY: HOUSING INSECURITY
IN THE LAST 12 MONTHS, WAS THERE A TIME WHEN YOU DID NOT HAVE A STEADY PLACE TO SLEEP OR SLEPT IN A SHELTER (INCLUDING NOW)?: NO

## 2023-05-22 SDOH — ECONOMIC STABILITY: FOOD INSECURITY: WITHIN THE PAST 12 MONTHS, THE FOOD YOU BOUGHT JUST DIDN'T LAST AND YOU DIDN'T HAVE MONEY TO GET MORE.: NEVER TRUE

## 2023-05-22 ASSESSMENT — ENCOUNTER SYMPTOMS
NAUSEA: 0
DIARRHEA: 0
ABDOMINAL PAIN: 0
CONSTIPATION: 0
SHORTNESS OF BREATH: 0
VOMITING: 0
COUGH: 0

## 2023-05-22 NOTE — PROGRESS NOTES
[facility]  [address] [facility] [address]  Dept: [phone]  Dept Fax: 01 : [phone]     Visit Date:  5/22/2023    Patient:  Elizabeth Chaparro  YOB: 1955    HPI:     Chief Complaint   Patient presents with    Diabetes     Forgot glucometer at home  FSBG 98    Hypertension    Hyperlipidemia       She is here today , with spouse due to above. She has been a diabetic for some time, mother of 3 kids. Admits to forgetting things getting worse, no CP or SOB. She is a non smoker, no fever or chills, no recent visual disturbances, no HA . She does not believe in flu or covid vaccines. Last time  we have done a mini mental ,  total score of 20 . She claims ? Dementia is stable. She is being followed by the local clinic for dementia work up . She checks sugars twice daily . She plans on getting an eye exam  Jan 2023.  , up todate on yearly foot exam. Pt denies frequent MVA, no recent hospitalizations. Hank Anne is a pleasant 77-year-old female with history of above issues I have seen her almost 3 to 4 years back,   Records have been reviewed and following up with her today. A1c is great at 6.2   Vs 8.4  she denies any chest pain shortness of breath no syncopal episodes. She does exercise on a regular basis. She does not want to get the covid vaccine, per her Sikh. DM -  On Metformin 2000 mg daily. Average glucose is 100,   Range from 71 to 143. Stopped tradjenta due or lower sugars   At times, and with mild dementia. Heart murmur - EF 65%, with mild aortic sclerosis on echo 9/20/19. HTN -  ,claims per family she is taking the meds  Changed ACE to ARB , diovan 160 mg daily on the last visit, tolerating it well and home monitoring was   Recommended. HLD - On Lipitor 10 mg daily. ,  Tolerating statins well and will continue current dose.  Last lipid panel from 10/21 noted, LDL

## 2023-07-24 ENCOUNTER — OFFICE VISIT (OUTPATIENT)
Dept: INTERNAL MEDICINE CLINIC | Age: 68
End: 2023-07-24
Payer: MEDICARE

## 2023-07-24 VITALS — HEIGHT: 64 IN | BODY MASS INDEX: 19.02 KG/M2 | WEIGHT: 111.4 LBS

## 2023-07-24 DIAGNOSIS — E11.9 CONTROLLED TYPE 2 DIABETES MELLITUS WITHOUT COMPLICATION, WITHOUT LONG-TERM CURRENT USE OF INSULIN (HCC): Primary | ICD-10-CM

## 2023-07-24 PROCEDURE — 97803 MED NUTRITION INDIV SUBSEQ: CPT | Performed by: DIETITIAN, REGISTERED

## 2023-07-24 PROCEDURE — NBSRV NON-BILLABLE SERVICE: Performed by: DIETITIAN, REGISTERED

## 2023-07-24 NOTE — PATIENT INSTRUCTIONS
Good to work in vegetables with a meal especially with lunch and dinner. Do batch cooking on a day when not busy so you have leftovers for later days in the week OR can freeze portions away in the freezer for future use. Good that you are looking at added sugars on the label and avoiding foods high in sugars. - If not carb counting then follow the plate pictures. - Try to have at least 3 food groups represented at each meal.    Include protein with your cereal meal. - Example - nuts or pbutter or egg or cheese stick.     Bring a 1 week food log and your meter again to next  center appt October 10th with Eileen Levin.
Yes

## 2023-09-25 ENCOUNTER — OFFICE VISIT (OUTPATIENT)
Dept: INTERNAL MEDICINE CLINIC | Age: 68
End: 2023-09-25
Payer: MEDICARE

## 2023-09-25 VITALS
DIASTOLIC BLOOD PRESSURE: 64 MMHG | BODY MASS INDEX: 18.95 KG/M2 | HEART RATE: 86 BPM | WEIGHT: 110.4 LBS | SYSTOLIC BLOOD PRESSURE: 112 MMHG | RESPIRATION RATE: 18 BRPM | TEMPERATURE: 97.1 F | OXYGEN SATURATION: 97 %

## 2023-09-25 DIAGNOSIS — I10 PRIMARY HYPERTENSION: ICD-10-CM

## 2023-09-25 DIAGNOSIS — E11.9 CONTROLLED TYPE 2 DIABETES MELLITUS WITHOUT COMPLICATION, WITHOUT LONG-TERM CURRENT USE OF INSULIN (HCC): Primary | ICD-10-CM

## 2023-09-25 DIAGNOSIS — E78.00 PURE HYPERCHOLESTEROLEMIA: ICD-10-CM

## 2023-09-25 LAB — HBA1C MFR BLD: 6.4 % (ref 4.3–5.7)

## 2023-09-25 PROCEDURE — 83036 HEMOGLOBIN GLYCOSYLATED A1C: CPT | Performed by: INTERNAL MEDICINE

## 2023-09-25 PROCEDURE — 99213 OFFICE O/P EST LOW 20 MIN: CPT | Performed by: INTERNAL MEDICINE

## 2023-09-25 PROCEDURE — 3078F DIAST BP <80 MM HG: CPT | Performed by: INTERNAL MEDICINE

## 2023-09-25 PROCEDURE — G8400 PT W/DXA NO RESULTS DOC: HCPCS | Performed by: INTERNAL MEDICINE

## 2023-09-25 PROCEDURE — G8420 CALC BMI NORM PARAMETERS: HCPCS | Performed by: INTERNAL MEDICINE

## 2023-09-25 PROCEDURE — 3044F HG A1C LEVEL LT 7.0%: CPT | Performed by: INTERNAL MEDICINE

## 2023-09-25 PROCEDURE — 3017F COLORECTAL CA SCREEN DOC REV: CPT | Performed by: INTERNAL MEDICINE

## 2023-09-25 PROCEDURE — 1036F TOBACCO NON-USER: CPT | Performed by: INTERNAL MEDICINE

## 2023-09-25 PROCEDURE — 1090F PRES/ABSN URINE INCON ASSESS: CPT | Performed by: INTERNAL MEDICINE

## 2023-09-25 PROCEDURE — 2022F DILAT RTA XM EVC RTNOPTHY: CPT | Performed by: INTERNAL MEDICINE

## 2023-09-25 PROCEDURE — 1123F ACP DISCUSS/DSCN MKR DOCD: CPT | Performed by: INTERNAL MEDICINE

## 2023-09-25 PROCEDURE — G8427 DOCREV CUR MEDS BY ELIG CLIN: HCPCS | Performed by: INTERNAL MEDICINE

## 2023-09-25 PROCEDURE — 3074F SYST BP LT 130 MM HG: CPT | Performed by: INTERNAL MEDICINE

## 2023-09-25 ASSESSMENT — ENCOUNTER SYMPTOMS
VOMITING: 0
CONSTIPATION: 0
COUGH: 0
ABDOMINAL PAIN: 0
DIARRHEA: 0
NAUSEA: 0
SHORTNESS OF BREATH: 0

## 2023-09-25 NOTE — PROGRESS NOTES
results d/w pt        Medications    Current Outpatient Medications:     metFORMIN (GLUCOPHAGE-XR) 500 MG extended release tablet, New dose one BID, Disp: 180 tablet, Rfl: 1    valsartan (DIOVAN) 160 MG tablet, Take 1 tablet by mouth daily Stopped the lisinopril, Disp: 30 tablet, Rfl: 5    memantine (NAMENDA) 10 MG tablet, TAKE ONE TABLET BY MOUTH ONCE EVERY DAY, Disp: 30 tablet, Rfl: 5    atorvastatin (LIPITOR) 10 MG tablet, TAKE 1 TABLET BY MOUTH ONE TIME A DAY, Disp: 90 tablet, Rfl: 1    donepezil (ARICEPT) 10 MG tablet, Take 1 tablet by mouth nightly, Disp: 30 tablet, Rfl: 5    Multiple Vitamins-Minerals (THERAPEUTIC MULTIVITAMIN-MINERALS) tablet, Take 1 tablet by mouth daily, Disp: , Rfl:     aspirin 81 MG tablet, Take 1 tablet by mouth daily, Disp: 90 tablet, Rfl: 3    The patient has No Known Allergies. Past Medical History  Demetris Scherer  has a past medical history of Hyperlipidemia, Hypertension, and Type II or unspecified type diabetes mellitus without mention of complication, not stated as uncontrolled. Past Surgical History  The patient  has no past surgical history on file. Family History  This patient's family history includes Diabetes in her mother; Heart Disease in her father. Social History  Demetris Scherer  reports that she has never smoked. She has never used smokeless tobacco. She reports that she does not drink alcohol and does not use drugs.     Health Maintenance:    Health Maintenance   Topic Date Due    Hepatitis C screen  Never done    DTaP/Tdap/Td vaccine (1 - Tdap) Never done    Colorectal Cancer Screen  Never done    Breast cancer screen  Never done    Shingles vaccine (1 of 2) Never done    DEXA (modify frequency per FRAX score)  Never done    Hepatitis B vaccine (1 of 3 - Risk 3-dose series) Never done    GFR test (Diabetes, CKD 3-4, OR last GFR 15-59)  05/06/2015    Annual Wellness Visit (AWV)  Never done    Diabetic Alb to Cr ratio (uACR) test  10/15/2022    Flu vaccine (1) Never done

## 2023-10-10 ENCOUNTER — OFFICE VISIT (OUTPATIENT)
Dept: INTERNAL MEDICINE CLINIC | Age: 68
End: 2023-10-10

## 2023-10-10 VITALS
SYSTOLIC BLOOD PRESSURE: 110 MMHG | TEMPERATURE: 98 F | HEART RATE: 76 BPM | BODY MASS INDEX: 18.47 KG/M2 | HEIGHT: 64 IN | WEIGHT: 108.2 LBS | DIASTOLIC BLOOD PRESSURE: 68 MMHG

## 2023-10-10 DIAGNOSIS — E11.9 TYPE 2 DIABETES MELLITUS WITHOUT COMPLICATION, WITHOUT LONG-TERM CURRENT USE OF INSULIN (HCC): Primary | ICD-10-CM

## 2023-10-10 PROCEDURE — NBSRV NON-BILLABLE SERVICE: Performed by: REGISTERED NURSE

## 2023-10-10 NOTE — PATIENT INSTRUCTIONS
Good job bringing in your blood sugar readings and food log! Do bring your blood sugar meter to your appointments as well! Great job with your exercise with the stretch bands! Think about trying your exercises with the stretch band            Both in the morning and at night when you re not too busy          *the more you do your exercises, the stronger you get!! Keep checking your blood sugars 1-2 times per day         Goal . Let the clinic know if you are getting blood sugars         Over this goal more than half of the time.   Continue with your meal plan  Goal:  keep your weight at 108-110 pounds  Get your blood work done this Friday 10/13/2023

## 2023-10-13 LAB
ABSOLUTE BASO #: 0.06 K/UL (ref 0–0.2)
ABSOLUTE EOS #: 0.34 K/UL (ref 0–0.5)
ABSOLUTE LYMPH #: 1.97 K/UL (ref 1–4)
ABSOLUTE MONO #: 0.45 K/UL (ref 0.2–1)
ABSOLUTE NEUT #: 5.24 K/UL (ref 1.5–7.5)
BASOPHILS RELATIVE PERCENT: 0.7 %
EOSINOPHILS RELATIVE PERCENT: 4.2 %
HCT VFR BLD CALC: 36.4 % (ref 34–45)
HEMOGLOBIN: 12.1 G/DL (ref 11.5–15.5)
LYMPHOCYTE %: 24.3 %
MCH RBC QN AUTO: 29.7 PG (ref 25–33)
MCHC RBC AUTO-ENTMCNC: 33.2 G/DL (ref 31–36)
MCV RBC AUTO: 89.2 FL (ref 80–99)
MONOCYTES # BLD: 5.6 %
NEUTROPHILS RELATIVE PERCENT: 64.7 %
PDW BLD-RTO: 12.5 % (ref 11.5–15)
PLATELETS: 281 K/UL (ref 130–400)
PMV BLD AUTO: 12 FL (ref 9.3–13)
RBC: 4.08 M/UL (ref 3.8–5.4)
WBC: 8.1 K/UL (ref 3.5–11)

## 2023-10-14 LAB
ALBUMIN SERPL-MCNC: 4.9 G/DL (ref 3.5–5.2)
ALK PHOSPHATASE: 71 U/L (ref 40–142)
ALT SERPL-CCNC: 13 U/L (ref 5–40)
ANION GAP SERPL CALCULATED.3IONS-SCNC: 14 MEQ/L (ref 7–16)
AST SERPL-CCNC: 17 U/L (ref 9–40)
BILIRUB SERPL-MCNC: 0.5 MG/DL
BUN BLDV-MCNC: 21 MG/DL (ref 8–23)
CALCIUM SERPL-MCNC: 9.8 MG/DL (ref 8.5–10.5)
CHLORIDE BLD-SCNC: 100 MEQ/L (ref 95–107)
CHOLESTEROL/HDL RATIO: 1.6 RATIO
CHOLESTEROL: 139 MG/DL
CO2: 24 MEQ/L (ref 19–31)
CREAT SERPL-MCNC: 0.86 MG/DL (ref 0.6–1.3)
CREATINE, URINE: 29.6 MG/DL
EGFR IF NONAFRICAN AMERICAN: 73 ML/MIN/1.73
GLUCOSE: 99 MG/DL (ref 70–99)
HDLC SERPL-MCNC: 87 MG/DL
LDL CHOLESTEROL CALCULATED: 39 MG/DL
LDL/HDL RATIO: 0.4 RATIO
MICROALBUMIN/CREAT 24H UR: <1.2 MG/DL
MICROALBUMIN/CREAT UR-RTO: NORMAL MG/G
POTASSIUM SERPL-SCNC: 4 MEQ/L (ref 3.5–5.4)
SODIUM BLD-SCNC: 138 MEQ/L (ref 133–146)
TOTAL PROTEIN: 7.4 G/DL (ref 6.1–8.3)
TRIGL SERPL-MCNC: 67 MG/DL
VLDLC SERPL CALC-MCNC: 13 MG/DL

## 2023-10-16 ENCOUNTER — TELEPHONE (OUTPATIENT)
Dept: INTERNAL MEDICINE CLINIC | Age: 68
End: 2023-10-16

## 2023-10-16 NOTE — TELEPHONE ENCOUNTER
----- Message from Gabriela Samuels MD sent at 10/14/2023  9:43 PM EDT -----        Inform her labs look good, no med changes.        Electronically signed by Gabriela Samuels MD on 10/14/2023 at 9:43 PM      ----- Message -----  From: Elliot Agrawal Incoming Lab Results From [facility]  Sent: 9/25/2023   9:29 AM EDT  To: Gabriela Samuels MD

## 2023-11-07 DIAGNOSIS — E78.5 HYPERLIPIDEMIA, UNSPECIFIED HYPERLIPIDEMIA TYPE: ICD-10-CM

## 2023-11-07 RX ORDER — ATORVASTATIN CALCIUM 10 MG/1
TABLET, FILM COATED ORAL
Qty: 90 TABLET | Refills: 1 | Status: SHIPPED | OUTPATIENT
Start: 2023-11-07

## 2024-02-06 ENCOUNTER — OFFICE VISIT (OUTPATIENT)
Dept: INTERNAL MEDICINE CLINIC | Age: 69
End: 2024-02-06

## 2024-02-06 VITALS
SYSTOLIC BLOOD PRESSURE: 134 MMHG | TEMPERATURE: 98.3 F | BODY MASS INDEX: 19.43 KG/M2 | HEIGHT: 64 IN | HEART RATE: 77 BPM | WEIGHT: 113.8 LBS | DIASTOLIC BLOOD PRESSURE: 61 MMHG

## 2024-02-06 DIAGNOSIS — E11.9 TYPE 2 DIABETES MELLITUS WITHOUT COMPLICATION, WITHOUT LONG-TERM CURRENT USE OF INSULIN (HCC): Primary | ICD-10-CM

## 2024-02-06 LAB — HBA1C MFR BLD: 6.8 % (ref 4.3–5.7)

## 2024-02-06 NOTE — PROGRESS NOTES
[facility]  [address] [place] [address]  [phone] (phone)  [phone] (fax)    Patient ID: Mariann Rae 1955  Referring Provider: Dr. Goncalves     Patient's name and  were verified.    Subjective:    She presents for a follow-up diabetic visit. She has type 2 diabetes mellitus. She is compliant some of the time.  Assessment:     Lab Results   Component Value Date/Time    LABA1C 6.8 2024 08:43 AM    LABA1C 6.2 10/31/2022 07:35 AM    BUN 21 10/13/2023 07:35 AM    CREATININE 0.86 10/13/2023 07:35 AM     Vitals:    24 0912   BP: 134/61   Site: Right Upper Arm   Position: Sitting   Pulse: 77   Temp: 98.3 °F (36.8 °C)   Weight: 51.6 kg (113 lb 12.8 oz)   Height: 1.626 m (5' 4\")     Wt Readings from Last 3 Encounters:   24 51.6 kg (113 lb 12.8 oz)   10/10/23 49.1 kg (108 lb 3.2 oz)   23 50.1 kg (110 lb 6.4 oz)     Ht Readings from Last 3 Encounters:   24 1.626 m (5' 4\")   10/10/23 1.626 m (5' 4\")   23 1.626 m (5' 4\")     Est, Glom Filt Rate   Date Value Ref Range Status   2014 >90 ml/min/1.73m2 Final         Diabetes Pharmacotherapy:  Metformin XR 500mg BID    Glucose Trends:   Glucose at 2 hrs PPD today resulted at 292mg/dl  Current monitoring regimen: Fingerstick blood tests - 2 times daily  Home blood sugar trends:    -Fasting AM:    -Before lunch:   -Before dinner:    -Bedtime:  Any episodes of hypoglycemia? no   -Treats with na    Lifestyle Factors:   Previous visit with dietician: yes - 2023  Current diet: B: Rice Chex, 1/2 banana            L: brat; pretzels/ handful; fruit; zero sugar soda                       D: Northern beans; corn bread; fruit; zero soda                       Snacks: rare  protein bar                       Beverages: 2-3 zero sodas per day; water; occas coffee or tea  Current exercise: ADL's- moderate activity. Stretch bands \"when I want to\"    Health Maintenance:  Diabetes Management   Topic Date

## 2024-02-06 NOTE — PATIENT INSTRUCTIONS
Make an appointment to get your eyes checked again!!!  Great job staying active!!        --see if you can  your stretch bands when you        First sit down to read--try use your band for 5-10          Minutes with your arms and legs--then lay it down          And enjoy your reading     Walk outdoors when the weather allows  Good job with meal planning.      Avoid rabbit holes!       Have a celebration meal or a day--maybe 1 day per              Month          --then get back to your healthy meal plan!!  Think about meals that have more carbohydrate in them           --beans and corn bread. Skip fruit at those meals

## 2024-02-16 RX ORDER — VALSARTAN 160 MG/1
160 TABLET ORAL DAILY
Qty: 30 TABLET | Refills: 5 | Status: SHIPPED | OUTPATIENT
Start: 2024-02-16

## 2024-02-29 DIAGNOSIS — E78.5 HYPERLIPIDEMIA, UNSPECIFIED HYPERLIPIDEMIA TYPE: ICD-10-CM

## 2024-02-29 RX ORDER — ATORVASTATIN CALCIUM 10 MG/1
TABLET, FILM COATED ORAL
Qty: 90 TABLET | Refills: 1 | Status: SHIPPED | OUTPATIENT
Start: 2024-02-29

## 2024-02-29 NOTE — TELEPHONE ENCOUNTER
Last ordered 11/7, disp 90, refill 1    Last visit 9/25  Next visit 3/26    Component  Ref Range & Units 10/13/23 0735 10/31/22 0735 10/15/21 1319 10/21/20 0804 10/21/19 0806 10/18/18 0818 10/7/17   Cholesterol  <200 mg/dL 139 114 126 115 112 162 194 R   Triglycerides  <149 mg/dL 67 60 72 R 90 R 81 R 77 R 104 R   HDL  >39 mg/dL 87 63 71 R, CM 62 R, CM 61 R, CM 48 R, CM 65 R   LDL Calculated  <100 mg/dL 39 39     108 R   VLDL Cholesterol Calculated  <30 mg/dL 13 12        LDL/HDL Ratio  <3.22 RATIO 0.4 0.6        Chol/HDL Ratio  <4.44 RATIO 1.6 1.8     2.9 R   LDL Direct   46 R, CM 43 R, CM 42 R,  High  R, CM    CHOLESTEROL/HDL RELATIVE RISK   1.7 Low  R 1.8 Low  R 1.8 Low  R 3.3 Low  R    Direct-LDL / HDL Risk   0.6 R 0.6 R 0.6 R 2.4 R    VLDL   14 R 18 R 16 R 15 R 20 R

## 2024-03-26 ENCOUNTER — OFFICE VISIT (OUTPATIENT)
Dept: INTERNAL MEDICINE CLINIC | Age: 69
End: 2024-03-26
Payer: MEDICARE

## 2024-03-26 VITALS
OXYGEN SATURATION: 98 % | TEMPERATURE: 98 F | BODY MASS INDEX: 19.26 KG/M2 | HEART RATE: 72 BPM | DIASTOLIC BLOOD PRESSURE: 72 MMHG | WEIGHT: 112.2 LBS | SYSTOLIC BLOOD PRESSURE: 116 MMHG | RESPIRATION RATE: 18 BRPM

## 2024-03-26 DIAGNOSIS — E78.00 PURE HYPERCHOLESTEROLEMIA: ICD-10-CM

## 2024-03-26 DIAGNOSIS — G30.9 ALZHEIMER'S DISEASE, UNSPECIFIED (CODE) (HCC): ICD-10-CM

## 2024-03-26 DIAGNOSIS — E78.5 HYPERLIPIDEMIA, UNSPECIFIED HYPERLIPIDEMIA TYPE: ICD-10-CM

## 2024-03-26 DIAGNOSIS — E11.9 CONTROLLED TYPE 2 DIABETES MELLITUS WITHOUT COMPLICATION, UNSPECIFIED WHETHER LONG TERM INSULIN USE (HCC): Primary | ICD-10-CM

## 2024-03-26 PROCEDURE — G8420 CALC BMI NORM PARAMETERS: HCPCS | Performed by: INTERNAL MEDICINE

## 2024-03-26 PROCEDURE — 2022F DILAT RTA XM EVC RTNOPTHY: CPT | Performed by: INTERNAL MEDICINE

## 2024-03-26 PROCEDURE — 3074F SYST BP LT 130 MM HG: CPT | Performed by: INTERNAL MEDICINE

## 2024-03-26 PROCEDURE — G8427 DOCREV CUR MEDS BY ELIG CLIN: HCPCS | Performed by: INTERNAL MEDICINE

## 2024-03-26 PROCEDURE — 1090F PRES/ABSN URINE INCON ASSESS: CPT | Performed by: INTERNAL MEDICINE

## 2024-03-26 PROCEDURE — 3044F HG A1C LEVEL LT 7.0%: CPT | Performed by: INTERNAL MEDICINE

## 2024-03-26 PROCEDURE — 3078F DIAST BP <80 MM HG: CPT | Performed by: INTERNAL MEDICINE

## 2024-03-26 PROCEDURE — 1036F TOBACCO NON-USER: CPT | Performed by: INTERNAL MEDICINE

## 2024-03-26 PROCEDURE — G8400 PT W/DXA NO RESULTS DOC: HCPCS | Performed by: INTERNAL MEDICINE

## 2024-03-26 PROCEDURE — 1123F ACP DISCUSS/DSCN MKR DOCD: CPT | Performed by: INTERNAL MEDICINE

## 2024-03-26 PROCEDURE — G8484 FLU IMMUNIZE NO ADMIN: HCPCS | Performed by: INTERNAL MEDICINE

## 2024-03-26 PROCEDURE — 3017F COLORECTAL CA SCREEN DOC REV: CPT | Performed by: INTERNAL MEDICINE

## 2024-03-26 PROCEDURE — 99213 OFFICE O/P EST LOW 20 MIN: CPT | Performed by: INTERNAL MEDICINE

## 2024-03-26 RX ORDER — METFORMIN HYDROCHLORIDE 500 MG/1
TABLET, EXTENDED RELEASE ORAL
Qty: 180 TABLET | Refills: 1 | Status: SHIPPED | OUTPATIENT
Start: 2024-03-26

## 2024-03-26 RX ORDER — MEMANTINE HYDROCHLORIDE 10 MG/1
TABLET ORAL
Qty: 30 TABLET | Refills: 5 | Status: SHIPPED | OUTPATIENT
Start: 2024-03-26

## 2024-03-26 RX ORDER — ATORVASTATIN CALCIUM 10 MG/1
TABLET, FILM COATED ORAL
Qty: 90 TABLET | Refills: 1 | Status: SHIPPED | OUTPATIENT
Start: 2024-03-26

## 2024-03-26 ASSESSMENT — ENCOUNTER SYMPTOMS
COUGH: 0
ABDOMINAL PAIN: 0
NAUSEA: 0
DIARRHEA: 0
SHORTNESS OF BREATH: 0
CONSTIPATION: 0
VOMITING: 0

## 2024-03-26 ASSESSMENT — PATIENT HEALTH QUESTIONNAIRE - PHQ9
SUM OF ALL RESPONSES TO PHQ QUESTIONS 1-9: 0
SUM OF ALL RESPONSES TO PHQ9 QUESTIONS 1 & 2: 0
SUM OF ALL RESPONSES TO PHQ QUESTIONS 1-9: 0
1. LITTLE INTEREST OR PLEASURE IN DOING THINGS: NOT AT ALL
SUM OF ALL RESPONSES TO PHQ QUESTIONS 1-9: 0
2. FEELING DOWN, DEPRESSED OR HOPELESS: NOT AT ALL
SUM OF ALL RESPONSES TO PHQ QUESTIONS 1-9: 0

## 2024-03-26 NOTE — PROGRESS NOTES
Microalbumin / Creatinine Urine Ratio; Future    Eye exam done recently  1/23    2. Likely with ?? mild alzheimers dementia - noted recent labs, MMSE was done today its 20 , will initiate her low dose namenda, and obtain labs CBC with B 12, and folate levels, and CT head w/o contrast, and carotid dopplers due to mild   Bruit noted on today's carotid exam. The CT and carotid u/s noted from Jan, added aricept to the regimen., in addition to namenda .   And referred to neurology at [facility], per pt's preference for further work up. ,  Seen dr. Cavazos, felt she did not have dementia.     2. Essential hypertension  Previous  higher readings at home and in the office, changed ACE to ARB  and she is tolerating them well, and readings are better, normotensive. Last EKG noted from 4/21    3. Mixed hyperlipidemia  Continue with lipitor 10 mg daily .    - Lipid, Fasting; Future, previous labs from 10/22 noted below    Lab Results   Component Value Date    CHOL 139 10/13/2023    CHOL 114 10/31/2022    CHOL 126 10/15/2021     Lab Results   Component Value Date    TRIG 67 10/13/2023    TRIG 60 10/31/2022    TRIG 72 10/15/2021     Lab Results   Component Value Date    HDL 87 10/13/2023    HDL 63 10/31/2022    HDL 71 10/15/2021     Lab Results   Component Value Date    LDLCALC 39 10/13/2023    LDLCALC 39 10/31/2022    LDLCALC 108 10/07/2017     Lab Results   Component Value Date    VLDL 14 10/15/2021    VLDL 18 10/21/2020    VLDL 16 10/21/2019     Lab Results   Component Value Date    CHOLHDLRATIO 1.6 10/13/2023    CHOLHDLRATIO 1.8 10/31/2022    CHOLHDLRATIO 2.9 10/07/2017         4.  albuminuria  On ACEi. , tolerating them well.   Recheck urine in 6 months.   - Microalbumin / Creatinine Urine Ratio; was normal on this visit            Check labs and urine for microalb pre next visit      Electronically signed by Prakash Goncalves MD on 3/26/2024 at 9:24 AM

## 2024-06-03 ENCOUNTER — OFFICE VISIT (OUTPATIENT)
Dept: INTERNAL MEDICINE CLINIC | Age: 69
End: 2024-06-03

## 2024-06-03 VITALS
TEMPERATURE: 98.3 F | BODY MASS INDEX: 19.4 KG/M2 | DIASTOLIC BLOOD PRESSURE: 70 MMHG | HEART RATE: 75 BPM | SYSTOLIC BLOOD PRESSURE: 132 MMHG | WEIGHT: 113 LBS

## 2024-06-03 DIAGNOSIS — E11.65 TYPE 2 DIABETES MELLITUS WITH HYPERGLYCEMIA, WITHOUT LONG-TERM CURRENT USE OF INSULIN (HCC): Primary | ICD-10-CM

## 2024-06-03 LAB — HBA1C MFR BLD: 7.2 % (ref 4.3–5.7)

## 2024-06-03 NOTE — PROGRESS NOTES
< 7.5%. She reports to attending a lot of Gnosticist parties and graduation parties over the past couple of weeks. Encouraged patient to make healthier food choices, such as more non-starchy vegetables. Also, encouraged patient to increase physical activity, such as walking 3 times per week. She stated that she plans on starting this week with walking a few days per week. Much encouragement given.     Curriculum Area Focus: Glucose checks/goals, Carbohydrate counting/meal planning, Physical activity goals, and Lifestyle & healthy coping  DSME PLAN:     Patient Instructions   Increasing walking to a few days a week (try 3 days per week). Continue to use resistance bands.   Try to incorporate more non-starchy vegetables. Continue to cut out sugar free candy  Keep checking your blood sugars 1-2 times per day  Goal . Let the clinic know if you are getting blood sugars over this goal more than half of the time.     Goals Addressed    None          Meter download, medications, PMH and nursing assessment reviewed.  Mariann NOAM Rae states She is willing to participate in this plan of care and verbalized understanding of all instructions provided. Teach back used to verify comprehension.      Follow-up: 4 months with PCP; 5 months DM clinic    Total time involved in direct patient education: 60 minutes.   Individual Education appointment justified due to: Class Availability    For Pharmacy Admin Tracking Only    Program: Medical Group  Time Spent (min): 60    Saidayamilet Huntley, LavonD, BCPS  6/3/2024  9:46 AM

## 2024-06-03 NOTE — PATIENT INSTRUCTIONS
Increasing walking to a few days a week (try 3 days per week). Continue to use resistance bands.   Try to incorporate more non-starchy vegetables. Continue to cut out sugar free candy  Keep checking your blood sugars 1-2 times per day  Goal . Let the clinic know if you are getting blood sugars over this goal more than half of the time.

## 2024-09-19 LAB
ESTIMATED AVERAGE GLUCOSE: 163 MG/DL
HBA1C MFR BLD: 7.3 % (ref 4.2–5.6)

## 2024-09-20 LAB
ALBUMIN: 4.4 G/DL (ref 3.5–5.2)
ALK PHOSPHATASE: 88 U/L (ref 40–142)
ALT SERPL-CCNC: 29 U/L (ref 5–40)
ANION GAP SERPL CALCULATED.3IONS-SCNC: 11 MEQ/L (ref 7–16)
AST SERPL-CCNC: 24 U/L (ref 9–40)
BILIRUB SERPL-MCNC: 0.3 MG/DL
BUN BLDV-MCNC: 22 MG/DL (ref 8–23)
CALCIUM SERPL-MCNC: 9.6 MG/DL (ref 8.5–10.5)
CHLORIDE BLD-SCNC: 106 MEQ/L (ref 95–107)
CHOLESTEROL, TOTAL: 175 MG/DL
CHOLESTEROL/HDL RATIO: 2.2 RATIO
CO2: 25 MEQ/L (ref 19–31)
CREAT SERPL-MCNC: 0.93 MG/DL (ref 0.6–1.3)
EGFR IF NONAFRICAN AMERICAN: 66 ML/MIN/1.73
GLUCOSE: 138 MG/DL (ref 70–99)
HDLC SERPL-MCNC: 79 MG/DL
LDL CHOLESTEROL: 85 MG/DL
LDL/HDL RATIO: 1.1 RATIO
POTASSIUM SERPL-SCNC: 4.4 MEQ/L (ref 3.5–5.4)
SODIUM BLD-SCNC: 142 MEQ/L (ref 133–146)
TOTAL PROTEIN: 7.3 G/DL (ref 6.1–8.3)
TRIGL SERPL-MCNC: 56 MG/DL
VLDLC SERPL CALC-MCNC: 11 MG/DL

## 2024-10-23 ENCOUNTER — OFFICE VISIT (OUTPATIENT)
Dept: INTERNAL MEDICINE CLINIC | Age: 69
End: 2024-10-23
Payer: MEDICARE

## 2024-10-23 ENCOUNTER — OFFICE VISIT (OUTPATIENT)
Dept: INTERNAL MEDICINE CLINIC | Age: 69
End: 2024-10-23

## 2024-10-23 VITALS
HEART RATE: 83 BPM | DIASTOLIC BLOOD PRESSURE: 78 MMHG | BODY MASS INDEX: 21.31 KG/M2 | SYSTOLIC BLOOD PRESSURE: 132 MMHG | HEIGHT: 64 IN | TEMPERATURE: 97.5 F | WEIGHT: 124.8 LBS

## 2024-10-23 VITALS
OXYGEN SATURATION: 98 % | BODY MASS INDEX: 21.66 KG/M2 | HEART RATE: 84 BPM | SYSTOLIC BLOOD PRESSURE: 130 MMHG | RESPIRATION RATE: 18 BRPM | DIASTOLIC BLOOD PRESSURE: 80 MMHG | TEMPERATURE: 97.1 F | WEIGHT: 126.2 LBS

## 2024-10-23 DIAGNOSIS — E78.5 HYPERLIPIDEMIA, UNSPECIFIED HYPERLIPIDEMIA TYPE: ICD-10-CM

## 2024-10-23 DIAGNOSIS — I10 PRIMARY HYPERTENSION: ICD-10-CM

## 2024-10-23 DIAGNOSIS — E11.9 CONTROLLED TYPE 2 DIABETES MELLITUS WITHOUT COMPLICATION, WITHOUT LONG-TERM CURRENT USE OF INSULIN (HCC): Primary | ICD-10-CM

## 2024-10-23 DIAGNOSIS — E11.49 CONTROLLED TYPE 2 DIABETES MELLITUS WITH OTHER NEUROLOGIC COMPLICATION, UNSPECIFIED WHETHER LONG TERM INSULIN USE (HCC): ICD-10-CM

## 2024-10-23 DIAGNOSIS — E11.65 TYPE 2 DIABETES MELLITUS WITH HYPERGLYCEMIA, WITHOUT LONG-TERM CURRENT USE OF INSULIN (HCC): Primary | ICD-10-CM

## 2024-10-23 PROCEDURE — NBSRV NON-BILLABLE SERVICE: Performed by: REGISTERED NURSE

## 2024-10-23 PROCEDURE — G0108 DIAB MANAGE TRN  PER INDIV: HCPCS | Performed by: REGISTERED NURSE

## 2024-10-23 RX ORDER — DONEPEZIL HYDROCHLORIDE 10 MG/1
10 TABLET, FILM COATED ORAL NIGHTLY
Qty: 90 TABLET | Refills: 1 | Status: SHIPPED | OUTPATIENT
Start: 2024-10-23

## 2024-10-23 RX ORDER — ATORVASTATIN CALCIUM 10 MG/1
TABLET, FILM COATED ORAL
Qty: 90 TABLET | Refills: 1 | Status: SHIPPED | OUTPATIENT
Start: 2024-10-23

## 2024-10-23 RX ORDER — VALSARTAN 160 MG/1
160 TABLET ORAL DAILY
Qty: 90 TABLET | Refills: 1 | Status: SHIPPED | OUTPATIENT
Start: 2024-10-23

## 2024-10-23 RX ORDER — MEMANTINE HYDROCHLORIDE 10 MG/1
TABLET ORAL
Qty: 90 TABLET | Refills: 1 | Status: SHIPPED | OUTPATIENT
Start: 2024-10-23

## 2024-10-23 RX ORDER — METFORMIN HYDROCHLORIDE 500 MG/1
TABLET, EXTENDED RELEASE ORAL
Qty: 180 TABLET | Refills: 1 | Status: SHIPPED | OUTPATIENT
Start: 2024-10-23

## 2024-10-23 SDOH — ECONOMIC STABILITY: FOOD INSECURITY: WITHIN THE PAST 12 MONTHS, THE FOOD YOU BOUGHT JUST DIDN'T LAST AND YOU DIDN'T HAVE MONEY TO GET MORE.: NEVER TRUE

## 2024-10-23 SDOH — ECONOMIC STABILITY: FOOD INSECURITY: WITHIN THE PAST 12 MONTHS, YOU WORRIED THAT YOUR FOOD WOULD RUN OUT BEFORE YOU GOT MONEY TO BUY MORE.: NEVER TRUE

## 2024-10-23 SDOH — ECONOMIC STABILITY: INCOME INSECURITY: HOW HARD IS IT FOR YOU TO PAY FOR THE VERY BASICS LIKE FOOD, HOUSING, MEDICAL CARE, AND HEATING?: NOT HARD AT ALL

## 2024-10-23 ASSESSMENT — ENCOUNTER SYMPTOMS
COUGH: 0
DIARRHEA: 0
VOMITING: 0
CONSTIPATION: 0
SHORTNESS OF BREATH: 0
ABDOMINAL PAIN: 0
NAUSEA: 0

## 2024-10-23 NOTE — PROGRESS NOTES
[facility] PROFESSIONAL SERVS  [facility] INTERNAL MEDICINE  [address]  [address]  [place] [address]  Dept: [phone]  Dept Fax: [phone]  Loc: [phone]     Visit Date:  10/23/2024    Patient:  Mariann Rae  YOB: 1955    HPI:     Chief Complaint   Patient presents with    Diabetes    Hypertension    Hyperlipidemia       She has been a diabetic for some time, mother of 3 kids. Admits to forgetting things ,getting worse. Denies  CP or SOB.  She is a non smoker, no fever or chills, no recent visual disturbances, no HA . She does not believe in flu or covid vaccines. Last time  we have done a mini mental ,  total score of 20 . She claims ? Dementia is stable.   She is being followed by the local clinic for dementia work up .  She checks sugars twice daily .  She got an eye exam coming up , up todate on yearly foot exam. Pt denies frequent MVA, no recent hospitalizations. Was seen by our dietician recently, and educated re DM. Admits to eating more sugar free candy, and wants to quit   On that.  No dizzy spells. No recent fever or chillls.   Claims lately she is not taking meds at times, and feels   Her social issues are not the best at home.     No  recent hospitalizations, lives home with spouse. She is not interested in getting mammogram or  colonoscope , we spoke about this in the past. No bloody stools, weight has been relatively stable. She is aware of the risks involved  By not getting the screening tests,  has an appt  With a doctor , so he dropped her off today . No dizzy spells and no LOC.        Latest Reference Range & Units 06/24/22 07:30 10/31/22 07:35 05/22/23 14:42 09/25/23 09:03 02/06/24 08:43 06/03/24 08:22 09/19/24 07:30   Hemoglobin A1C 4.2 - 5.6 % 6.5 (H) 6.2 (H) 6.2 (H) 6.4 (H) 6.8 (H) 7.2 (H) 7.3 (H)   (H): Data is abnormally high            DM -  On Metformin 2000 mg daily.   Average glucose is 100,   Range from  79  to 155 On the

## 2024-10-23 NOTE — PATIENT INSTRUCTIONS
See if you can get a meter/ strips from your [facility] office     Call Blaise and let her know  [phone]. We can send in a script to [facility]           For meter/strips/ lancets covered under Medicare      Start checking your blood sugars once every day             Blood sugar goal   Keep walking every day --outside when the weather is nice                                            Inside when it is cold                    At least 10 minutes             Add the stretch band exercise repetitions of 10 with arms                                                              -side, front, up, down. Both arms                              Then 50 repetitions with legs--pulling knees apart                                 And 50 repetitions pedaling each foot with the band (gas pedal)  We will talk with Dr. Goncalves about your Diabetes medication therapy

## 2024-10-23 NOTE — PROGRESS NOTES
[facility]  [address] [place] [address]  [phone] (phone)  [phone] (fax)    Patient ID: Mariann Rae 1955  Referring Provider: Dr. Goncalves     Patient's name and  were verified.    Subjective:    She presents for a follow-up diabetic visit. She has type 2 diabetes mellitus. She is compliant some of the time.  Assessment:     Lab Results   Component Value Date/Time    LABA1C 7.3 2024 07:30 AM    BUN 22 2024 07:30 AM    CREATININE 0.93 2024 07:30 AM     Vitals:    10/23/24 09   BP: 132/78   Site: Left Upper Arm   Position: Sitting   Pulse: 83   Temp: 97.5 °F (36.4 °C)   Weight: 56.6 kg (124 lb 12.8 oz)   Height: 1.626 m (5' 4\")     Wt Readings from Last 3 Encounters:   10/23/24 56.6 kg (124 lb 12.8 oz)   10/23/24 57.2 kg (126 lb 3.2 oz)   24 51.3 kg (113 lb)     Ht Readings from Last 3 Encounters:   10/23/24 1.626 m (5' 4\")   24 1.626 m (5' 4\")   10/10/23 1.626 m (5' 4\")     Est, Glom Filt Rate   Date Value Ref Range Status   2014 >90 ml/min/1.73m2 Final     Diabetes Pharmacotherapy:  Metformin XR 500mg BID    Glucose Trends:   Glucose at 3.5 hrs PPD today resulted at 231mg/dl  Current monitoring regimen: Fingerstick blood tests - has not checked for last month  Home blood sugar trends:    -no BG checks for at least 1 month  Any episodes of hypoglycemia? no   -Treats with na    Lifestyle Factors:   Previous visit with dietician: yes - 2023  Current diet: B: rice chex/ banana            L: gr peppers/ onions/ egg salad                       D: ham / potato/ green beans                       Snacks: whole wheat crackers 1 handful (9-12). Evening cottage cheese/                                        Mandarin oranges                       Beverages: 2 diet  Pepper cream soda  Current exercise: Walking around the house/ around the block when not too cold                   Walks in the house 10 minutes if too cold outside    Health

## 2024-10-28 DIAGNOSIS — E11.9 CONTROLLED TYPE 2 DIABETES MELLITUS WITHOUT COMPLICATION, UNSPECIFIED WHETHER LONG TERM INSULIN USE (HCC): Primary | ICD-10-CM

## 2024-10-28 RX ORDER — BLOOD-GLUCOSE METER
1 EACH MISCELLANEOUS ONCE
Qty: 1 KIT | Refills: 0 | Status: SHIPPED | OUTPATIENT
Start: 2024-10-28 | End: 2024-10-28

## 2024-10-28 RX ORDER — BLOOD SUGAR DIAGNOSTIC
1 STRIP MISCELLANEOUS DAILY
Qty: 100 EACH | Refills: 3 | Status: SHIPPED | OUTPATIENT
Start: 2024-10-28

## 2024-10-28 RX ORDER — LANCETS 30 GAUGE
1 EACH MISCELLANEOUS DAILY
Qty: 100 EACH | Refills: 3 | Status: SHIPPED | OUTPATIENT
Start: 2024-10-28

## 2024-10-28 NOTE — TELEPHONE ENCOUNTER
Dr. Goncalves,  Would you consider adding SGLT2 therapy (potential for more cost) or Pioglitizone low dose to help glucose levels in goal? There are no contraindications noted for either.    Please advise.

## 2024-10-30 ENCOUNTER — TELEPHONE (OUTPATIENT)
Dept: INTERNAL MEDICINE CLINIC | Age: 69
End: 2024-10-30

## 2024-10-30 NOTE — TELEPHONE ENCOUNTER
Patient states she had allergic reaction to donepezil, \"swelled from the inside out\" and had to go to urgent care and get shot. Patient reports stopping medication and is asking for alternative.     Please advise.     Will obtain UC records.

## 2025-02-26 ENCOUNTER — OFFICE VISIT (OUTPATIENT)
Dept: INTERNAL MEDICINE CLINIC | Age: 70
End: 2025-02-26
Payer: MEDICARE

## 2025-02-26 VITALS
TEMPERATURE: 98.9 F | WEIGHT: 131.8 LBS | BODY MASS INDEX: 22.62 KG/M2 | SYSTOLIC BLOOD PRESSURE: 142 MMHG | DIASTOLIC BLOOD PRESSURE: 80 MMHG | HEART RATE: 82 BPM

## 2025-02-26 DIAGNOSIS — E11.49 CONTROLLED TYPE 2 DIABETES MELLITUS WITH OTHER NEUROLOGIC COMPLICATION, UNSPECIFIED WHETHER LONG TERM INSULIN USE (HCC): Primary | ICD-10-CM

## 2025-02-26 PROCEDURE — G0108 DIAB MANAGE TRN  PER INDIV: HCPCS | Performed by: PHARMACIST

## 2025-02-26 RX ORDER — METFORMIN HYDROCHLORIDE 500 MG/1
TABLET, EXTENDED RELEASE ORAL
Qty: 180 TABLET | Refills: 3 | Status: SHIPPED | OUTPATIENT
Start: 2025-02-26

## 2025-02-26 ASSESSMENT — PATIENT HEALTH QUESTIONNAIRE - PHQ9
SUM OF ALL RESPONSES TO PHQ QUESTIONS 1-9: 0
SUM OF ALL RESPONSES TO PHQ9 QUESTIONS 1 & 2: 0
1. LITTLE INTEREST OR PLEASURE IN DOING THINGS: NOT AT ALL
SUM OF ALL RESPONSES TO PHQ QUESTIONS 1-9: 0
2. FEELING DOWN, DEPRESSED OR HOPELESS: NOT AT ALL

## 2025-02-26 NOTE — PROGRESS NOTES
[facility]  [address] [place] [address]  [phone] (phone)  [phone] (fax)    Patient ID: Mariann Rae 1955  Referring Provider: Dr. Goncalves     Patient's name and  were verified.    Subjective:    She presents for a follow-up diabetic visit. She has type 2 diabetes mellitus. She is compliant a majority of the time.  Assessment:     Lab Results   Component Value Date/Time    LABA1C 7.3 2024 07:30 AM    BUN 22 2024 07:30 AM    CREATININE 0.93 2024 07:30 AM     Vitals:    25 0838 25 0839   BP: (!) 182/78 (!) 142/80   Pulse: 82    Temp: 98.9 °F (37.2 °C)    Weight: 59.8 kg (131 lb 12.8 oz)      Wt Readings from Last 3 Encounters:   25 59.8 kg (131 lb 12.8 oz)   10/23/24 56.6 kg (124 lb 12.8 oz)   10/23/24 57.2 kg (126 lb 3.2 oz)     Ht Readings from Last 3 Encounters:   10/23/24 1.626 m (5' 4\")   24 1.626 m (5' 4\")   10/10/23 1.626 m (5' 4\")       Diabetes Pharmacotherapy:  Metformin XR 500mg BID - off x1 month or longer, unsure why? Possibly out of refills    Glucose Trends:   Current monitoring regimen: Fingerstick blood tests - 2 times daily  Home blood sugar trends:               -Fasting AM: 144, 139, 160, 151, 147, 141, 147   -Before lunch:   -Before dinner: 117, 137, 166, 132, 240, 143   -Bedtime:  Any episodes of hypoglycemia? no    Lifestyle Factors:   Previous visit with dietician: yes - 2023  Current diet: B: rice chex w/ half banana            L: green peppers/onions OR cottage cheese + SF mandarin oranges + whole wheat crackers                       D: sausage patties + small portion m. Potatoes/green olives/cottage cheese/mandarin oranges                       Snacks: PB crackers, pretzels                       Beverages: water, dt. Soda- 3/day  Current exercise:  walks and does stretch band exercises    Health Maintenance:  Diabetes Management   Topic Date Due    Diabetic Alb to Cr ratio (uACR) test  10/13/2024

## 2025-02-26 NOTE — PATIENT INSTRUCTIONS
Fasting morning sugars are higher than at previous visit. Fasting morning blood sugar goal:  mg/dL    2. Time to restart the metformin - take 1 tablet twice daily   -Call if you have questions: [phone]

## 2025-04-12 ENCOUNTER — RESULTS FOLLOW-UP (OUTPATIENT)
Dept: INTERNAL MEDICINE CLINIC | Age: 70
End: 2025-04-12

## 2025-04-12 LAB
ALBUMIN: 4.4 G/DL (ref 3.5–5.2)
ALK PHOSPHATASE: 94 U/L (ref 30–146)
ALT SERPL-CCNC: 18 U/L (ref 5–33)
ANION GAP SERPL CALCULATED.3IONS-SCNC: 18 MMOL/L (ref 7–16)
AST SERPL-CCNC: 22 U/L (ref 9–40)
BILIRUB SERPL-MCNC: 0.4 MG/DL
BUN BLDV-MCNC: 19 MG/DL (ref 8–23)
CALCIUM SERPL-MCNC: 9.6 MG/DL (ref 8.6–10.5)
CHLORIDE BLD-SCNC: 101 MMOL/L (ref 96–107)
CO2: 21 MMOL/L (ref 18–32)
CREAT SERPL-MCNC: 0.8 MG/DL (ref 0.51–1.15)
CREATINE, URINE: 36.4 MG/DL
EGFR IF NONAFRICAN AMERICAN: 78 ML/MIN/1.73M2
ESTIMATED AVERAGE GLUCOSE: 163 MG/DL
GLUCOSE: 101 MG/DL (ref 70–100)
HBA1C MFR BLD: 7.3 %
MICROALBUMIN/CREAT 24H UR: 13.4 MG/L
MICROALBUMIN/CREAT UR-RTO: 37 MG/G
POTASSIUM SERPL-SCNC: 4.3 MMOL/L (ref 3.5–5.4)
SODIUM BLD-SCNC: 140 MMOL/L (ref 135–148)
TOTAL PROTEIN: 7.1 G/DL (ref 6–8.3)

## 2025-04-12 NOTE — TELEPHONE ENCOUNTER
IM addendum , follow up on recent labs    Today attempted to contact Mariann  As recent labs reveal increase micro alb  Currently on diovan 160 mg , will re eval   Her on 4/17 office visit, and consider changing to 320 mg daily.  Currently Mariann is not available , and her voice mail is not set up .       Electronically signed by Prakash Goncalves MD on 4/12/2025 at 2:37 PM

## 2025-04-15 DIAGNOSIS — E78.5 HYPERLIPIDEMIA, UNSPECIFIED HYPERLIPIDEMIA TYPE: ICD-10-CM

## 2025-04-16 RX ORDER — ATORVASTATIN CALCIUM 10 MG/1
TABLET, FILM COATED ORAL
Qty: 90 TABLET | Refills: 1 | Status: SHIPPED | OUTPATIENT
Start: 2025-04-16

## 2025-04-17 ENCOUNTER — OFFICE VISIT (OUTPATIENT)
Dept: INTERNAL MEDICINE CLINIC | Age: 70
End: 2025-04-17

## 2025-04-17 VITALS
HEIGHT: 64 IN | HEART RATE: 78 BPM | OXYGEN SATURATION: 99 % | BODY MASS INDEX: 22.43 KG/M2 | WEIGHT: 131.4 LBS | SYSTOLIC BLOOD PRESSURE: 132 MMHG | DIASTOLIC BLOOD PRESSURE: 70 MMHG

## 2025-04-17 DIAGNOSIS — E11.65 TYPE 2 DIABETES MELLITUS WITH HYPERGLYCEMIA, WITHOUT LONG-TERM CURRENT USE OF INSULIN (HCC): Primary | ICD-10-CM

## 2025-04-17 DIAGNOSIS — G30.9 ALZHEIMER'S DISEASE, UNSPECIFIED (CODE): ICD-10-CM

## 2025-04-17 DIAGNOSIS — E78.00 PURE HYPERCHOLESTEROLEMIA: ICD-10-CM

## 2025-04-17 DIAGNOSIS — E11.49 CONTROLLED TYPE 2 DIABETES MELLITUS WITH OTHER NEUROLOGIC COMPLICATION, UNSPECIFIED WHETHER LONG TERM INSULIN USE (HCC): ICD-10-CM

## 2025-04-17 DIAGNOSIS — I10 PRIMARY HYPERTENSION: ICD-10-CM

## 2025-04-17 RX ORDER — VALSARTAN 160 MG/1
160 TABLET ORAL DAILY
Qty: 90 TABLET | Refills: 1 | Status: SHIPPED | OUTPATIENT
Start: 2025-04-17

## 2025-04-17 RX ORDER — MEMANTINE HYDROCHLORIDE 10 MG/1
TABLET ORAL
Qty: 90 TABLET | Refills: 1 | Status: SHIPPED | OUTPATIENT
Start: 2025-04-17

## 2025-04-17 SDOH — ECONOMIC STABILITY: FOOD INSECURITY: WITHIN THE PAST 12 MONTHS, YOU WORRIED THAT YOUR FOOD WOULD RUN OUT BEFORE YOU GOT MONEY TO BUY MORE.: NEVER TRUE

## 2025-04-17 SDOH — ECONOMIC STABILITY: FOOD INSECURITY: WITHIN THE PAST 12 MONTHS, THE FOOD YOU BOUGHT JUST DIDN'T LAST AND YOU DIDN'T HAVE MONEY TO GET MORE.: NEVER TRUE

## 2025-04-17 ASSESSMENT — ENCOUNTER SYMPTOMS
ABDOMINAL PAIN: 0
VOMITING: 0
DIARRHEA: 0
SHORTNESS OF BREATH: 0
CONSTIPATION: 0
NAUSEA: 0
COUGH: 0

## 2025-04-17 NOTE — PROGRESS NOTES
deficit present.      Mental Status: She is alert and oriented to person, place, and time.      Cranial Nerves: No cranial nerve deficit.   Psychiatric:         Mood and Affect: Mood normal.         Thought Content: Thought content normal.         Labs Reviewed 4/17/2025:    Lab Results   Component Value Date    WBC 8.1 10/13/2023    HGB 12.1 10/13/2023    HCT 36.4 10/13/2023     10/13/2023    CHOL 175 09/19/2024    TRIG 56 09/19/2024    HDL 79 09/19/2024    LDLDIRECT 46 10/15/2021    ALT 18 04/11/2025    AST 22 04/11/2025     04/11/2025    K 4.3 04/11/2025     04/11/2025    CREATININE 0.80 04/11/2025    BUN 19 04/11/2025    CO2 21 04/11/2025    TSH 4.180 05/18/2022    GLUF 137 (H) 03/05/2020    LABA1C 7.3 (H) 04/11/2025       DIABETIC FOOT EXAM    Not due at this time       Assessment/Plan           Diagnosis Orders   1. Type 2 diabetes mellitus with hyperglycemia, without long-term current use of insulin (HCC)        2. Alzheimer's disease, unspecified (CODE)        3. Primary hypertension        4. Pure hypercholesterolemia        5. Controlled type 2 diabetes mellitus with other neurologic complication, unspecified whether long term insulin use (HCC)                 Diagnosis Orders   1. Type 2 diabetes mellitus with hyperglycemia, without long-term current use of insulin (HCC)        2. Alzheimer's disease, unspecified (CODE)        3. Primary hypertension        4. Pure hypercholesterolemia        5. Controlled type 2 diabetes mellitus with other neurologic complication, unspecified whether long term insulin use (HCC)             Diagnosis Orders   1. Type 2 diabetes mellitus with hyperglycemia, without long-term current use of insulin (HCC)        2. Alzheimer's disease, unspecified (CODE)        3. Primary hypertension        4. Pure hypercholesterolemia        5. Controlled type 2 diabetes mellitus with other neurologic complication, unspecified whether long term insulin use (HCC)

## 2025-06-19 ENCOUNTER — TELEPHONE (OUTPATIENT)
Dept: INTERNAL MEDICINE CLINIC | Age: 70
End: 2025-06-19

## 2025-06-19 NOTE — TELEPHONE ENCOUNTER
Received call from pharmacist Aparna alerting us that patients jardiance is no longer formulary and patient is unable to afford. She cannot afford farxiga either. Pharmacists is recommending a GLP 1 or sitagliptin or glyburide.     Please advise.